# Patient Record
Sex: FEMALE | URBAN - METROPOLITAN AREA
[De-identification: names, ages, dates, MRNs, and addresses within clinical notes are randomized per-mention and may not be internally consistent; named-entity substitution may affect disease eponyms.]

---

## 2017-01-16 ENCOUNTER — DOCTOR'S OFFICE (OUTPATIENT)
Dept: URBAN - METROPOLITAN AREA CLINIC 136 | Facility: CLINIC | Age: 52
Setting detail: OPHTHALMOLOGY
End: 2017-01-16

## 2017-01-16 DIAGNOSIS — E11.3523: ICD-10-CM

## 2017-01-16 DIAGNOSIS — H43.11: ICD-10-CM

## 2017-01-16 PROCEDURE — 99024 POSTOP FOLLOW-UP VISIT: CPT | Performed by: OPHTHALMOLOGY

## 2017-01-16 ASSESSMENT — REFRACTION_MANIFEST
OD_VA3: 20/
OD_VA3: 20/
OS_VA1: 20/
OD_VA1: 20/
OS_VA3: 20/
OD_VA1: 20/
OS_VA3: 20/
OD_VA2: 20/
OS_VA1: 20/
OU_VA: 20/
OS_VA2: 20/
OS_VA1: 20/
OS_VA2: 20/
OD_VA2: 20/
OD_VA2: 20/
OD_VA3: 20/
OU_VA: 20/
OS_VA3: 20/
OU_VA: 20/
OS_VA2: 20/
OD_VA1: 20/

## 2017-01-16 ASSESSMENT — REFRACTION_CURRENTRX
OS_OVR_VA: 20/
OD_OVR_VA: 20/
OD_OVR_VA: 20/
OS_OVR_VA: 20/
OS_OVR_VA: 20/
OD_OVR_VA: 20/

## 2017-01-16 ASSESSMENT — VISUAL ACUITY
OD_BCVA: 20/LP
OS_BCVA: 20/HM

## 2017-01-16 ASSESSMENT — REFRACTION_AUTOREFRACTION
OD_SPHERE: +0.75
OD_AXIS: 99
OD_CYLINDER: -2.00

## 2017-01-16 ASSESSMENT — CORNEAL PTERYGIUM: OD_PTERYGIUM: TEMPORAL NASAL

## 2017-01-16 ASSESSMENT — CONFRONTATIONAL VISUAL FIELD TEST (CVF): OD_FINDINGS: FULL

## 2017-01-16 ASSESSMENT — SPHEQUIV_DERIVED: OD_SPHEQUIV: -0.25

## 2017-02-03 ENCOUNTER — DOCTOR'S OFFICE (OUTPATIENT)
Dept: URBAN - METROPOLITAN AREA CLINIC 136 | Facility: CLINIC | Age: 52
Setting detail: OPHTHALMOLOGY
End: 2017-02-03
Payer: COMMERCIAL

## 2017-02-03 DIAGNOSIS — H43.11: ICD-10-CM

## 2017-02-03 DIAGNOSIS — E11.3523: ICD-10-CM

## 2017-02-03 PROCEDURE — 99024 POSTOP FOLLOW-UP VISIT: CPT | Performed by: OPHTHALMOLOGY

## 2017-02-03 PROCEDURE — 76512 OPH US DX B-SCAN: CPT | Performed by: OPHTHALMOLOGY

## 2017-02-03 ASSESSMENT — REFRACTION_AUTOREFRACTION
OD_CYLINDER: -2.00
OD_AXIS: 99
OD_SPHERE: +0.75

## 2017-02-03 ASSESSMENT — VISUAL ACUITY
OD_BCVA: 20/LP
OS_BCVA: 20/HM

## 2017-02-03 ASSESSMENT — SPHEQUIV_DERIVED: OD_SPHEQUIV: -0.25

## 2017-02-03 ASSESSMENT — CORNEAL PTERYGIUM: OD_PTERYGIUM: TEMPORAL NASAL

## 2017-04-14 ENCOUNTER — DOCTOR'S OFFICE (OUTPATIENT)
Dept: URBAN - METROPOLITAN AREA CLINIC 136 | Facility: CLINIC | Age: 52
Setting detail: OPHTHALMOLOGY
End: 2017-04-14
Payer: COMMERCIAL

## 2017-04-14 ENCOUNTER — RX ONLY (RX ONLY)
Age: 52
End: 2017-04-14

## 2017-04-14 DIAGNOSIS — Z96.1: ICD-10-CM

## 2017-04-14 DIAGNOSIS — E11.3523: ICD-10-CM

## 2017-04-14 DIAGNOSIS — H43.11: ICD-10-CM

## 2017-04-14 DIAGNOSIS — H11.061: ICD-10-CM

## 2017-04-14 DIAGNOSIS — H25.12: ICD-10-CM

## 2017-04-14 PROCEDURE — 92014 COMPRE OPH EXAM EST PT 1/>: CPT | Performed by: OPHTHALMOLOGY

## 2017-04-14 PROCEDURE — 92134 CPTRZ OPH DX IMG PST SGM RTA: CPT | Performed by: OPHTHALMOLOGY

## 2017-04-14 ASSESSMENT — REFRACTION_MANIFEST
OS_VA3: 20/
OD_VA2: 20/
OD_VA1: 20/
OS_VA2: 20/
OD_VA3: 20/
OD_VA3: 20/
OD_VA2: 20/
OS_VA1: 20/
OS_VA3: 20/
OD_VA3: 20/
OS_VA1: 20/
OD_VA2: 20/
OS_VA1: 20/
OD_VA1: 20/
OU_VA: 20/
OS_VA2: 20/
OU_VA: 20/
OU_VA: 20/
OS_VA3: 20/
OD_VA1: 20/
OS_VA2: 20/

## 2017-04-14 ASSESSMENT — REFRACTION_CURRENTRX
OS_OVR_VA: 20/
OD_OVR_VA: 20/

## 2017-04-14 ASSESSMENT — CORNEAL PTERYGIUM: OD_PTERYGIUM: TEMPORAL NASAL

## 2017-04-14 ASSESSMENT — REFRACTION_AUTOREFRACTION
OD_AXIS: 99
OD_SPHERE: +0.75
OD_CYLINDER: -2.00

## 2017-04-14 ASSESSMENT — VISUAL ACUITY
OD_BCVA: 20/LP
OS_BCVA: 20/HM

## 2017-04-14 ASSESSMENT — CONFRONTATIONAL VISUAL FIELD TEST (CVF): OD_FINDINGS: FULL

## 2017-04-14 ASSESSMENT — SPHEQUIV_DERIVED: OD_SPHEQUIV: -0.25

## 2017-05-05 ENCOUNTER — RX ONLY (RX ONLY)
Age: 52
End: 2017-05-05

## 2017-05-05 ENCOUNTER — DOCTOR'S OFFICE (OUTPATIENT)
Dept: URBAN - METROPOLITAN AREA CLINIC 136 | Facility: CLINIC | Age: 52
Setting detail: OPHTHALMOLOGY
End: 2017-05-05

## 2017-05-05 DIAGNOSIS — E11.3523: ICD-10-CM

## 2017-05-05 DIAGNOSIS — H11.061: ICD-10-CM

## 2017-05-05 DIAGNOSIS — H25.12: ICD-10-CM

## 2017-05-05 DIAGNOSIS — H43.11: ICD-10-CM

## 2017-05-05 DIAGNOSIS — Z96.1: ICD-10-CM

## 2017-05-05 PROCEDURE — NO CHARGE N/C PROFESSIONAL COURTESY: Performed by: OPHTHALMOLOGY

## 2017-05-05 ASSESSMENT — REFRACTION_MANIFEST
OU_VA: 20/
OS_VA2: 20/
OD_VA2: 20/
OD_VA1: 20/
OS_VA3: 20/
OD_VA2: 20/
OD_VA3: 20/
OS_VA2: 20/
OS_VA1: 20/
OD_VA1: 20/
OS_VA1: 20/
OU_VA: 20/
OU_VA: 20/
OS_VA3: 20/
OS_VA2: 20/
OD_VA2: 20/
OS_VA1: 20/
OS_VA3: 20/
OD_VA1: 20/
OD_VA3: 20/
OD_VA3: 20/

## 2017-05-05 ASSESSMENT — REFRACTION_AUTOREFRACTION
OD_SPHERE: +0.75
OD_AXIS: 99
OD_CYLINDER: -2.00

## 2017-05-05 ASSESSMENT — CONFRONTATIONAL VISUAL FIELD TEST (CVF): OD_FINDINGS: FULL

## 2017-05-05 ASSESSMENT — REFRACTION_CURRENTRX
OS_OVR_VA: 20/
OS_OVR_VA: 20/
OD_OVR_VA: 20/
OD_OVR_VA: 20/
OS_OVR_VA: 20/
OD_OVR_VA: 20/

## 2017-05-05 ASSESSMENT — CORNEAL PTERYGIUM: OD_PTERYGIUM: TEMPORAL NASAL

## 2017-05-05 ASSESSMENT — VISUAL ACUITY
OS_BCVA: 20/HM
OD_BCVA: 20/LP

## 2017-05-05 ASSESSMENT — SPHEQUIV_DERIVED: OD_SPHEQUIV: -0.25

## 2017-07-24 ENCOUNTER — APPOINTMENT (EMERGENCY)
Dept: RADIOLOGY | Facility: HOSPITAL | Age: 52
End: 2017-07-24
Payer: COMMERCIAL

## 2017-07-24 ENCOUNTER — HOSPITAL ENCOUNTER (EMERGENCY)
Facility: HOSPITAL | Age: 52
Discharge: HOME | End: 2017-07-25
Attending: EMERGENCY MEDICINE | Admitting: EMERGENCY MEDICINE
Payer: COMMERCIAL

## 2017-07-24 DIAGNOSIS — R56.9 SEIZURE (HCC): Primary | ICD-10-CM

## 2017-07-24 LAB
ALBUMIN SERPL BCP-MCNC: 2.9 G/DL (ref 3.5–5)
ALP SERPL-CCNC: 101 U/L (ref 46–116)
ALT SERPL W P-5'-P-CCNC: 69 U/L (ref 12–78)
ANION GAP SERPL CALCULATED.3IONS-SCNC: 8 MMOL/L (ref 4–13)
APTT PPP: 33 SECONDS (ref 23–35)
AST SERPL W P-5'-P-CCNC: 91 U/L (ref 5–45)
BASOPHILS # BLD AUTO: 0.02 THOUSANDS/ΜL (ref 0–0.1)
BASOPHILS NFR BLD AUTO: 0 % (ref 0–1)
BILIRUB SERPL-MCNC: 0.35 MG/DL (ref 0.2–1)
BILIRUB UR QL STRIP: NEGATIVE
BUN SERPL-MCNC: 29 MG/DL (ref 5–25)
CALCIUM SERPL-MCNC: 8.1 MG/DL (ref 8.3–10.1)
CHLORIDE SERPL-SCNC: 107 MMOL/L (ref 100–108)
CLARITY UR: CLEAR
CLARITY, POC: CLEAR
CO2 SERPL-SCNC: 22 MMOL/L (ref 21–32)
COLOR UR: YELLOW
COLOR, POC: YELLOW
CREAT SERPL-MCNC: 1.49 MG/DL (ref 0.6–1.3)
EOSINOPHIL # BLD AUTO: 0.05 THOUSAND/ΜL (ref 0–0.61)
EOSINOPHIL NFR BLD AUTO: 1 % (ref 0–6)
ERYTHROCYTE [DISTWIDTH] IN BLOOD BY AUTOMATED COUNT: 13.6 % (ref 11.6–15.1)
GFR SERPL CREATININE-BSD FRML MDRD: 41 ML/MIN/1.73SQ M
GLUCOSE SERPL-MCNC: 282 MG/DL (ref 65–140)
GLUCOSE UR STRIP-MCNC: ABNORMAL MG/DL
HCT VFR BLD AUTO: 32.3 % (ref 34.8–46.1)
HGB BLD-MCNC: 10.6 G/DL (ref 11.5–15.4)
HGB UR QL STRIP.AUTO: ABNORMAL
INR PPP: 0.98 (ref 0.86–1.16)
KETONES UR STRIP-MCNC: NEGATIVE MG/DL
LEUKOCYTE ESTERASE UR QL STRIP: NEGATIVE
LYMPHOCYTES # BLD AUTO: 0.81 THOUSANDS/ΜL (ref 0.6–4.47)
LYMPHOCYTES NFR BLD AUTO: 9 % (ref 14–44)
MCH RBC QN AUTO: 27.8 PG (ref 26.8–34.3)
MCHC RBC AUTO-ENTMCNC: 32.8 G/DL (ref 31.4–37.4)
MCV RBC AUTO: 85 FL (ref 82–98)
MONOCYTES # BLD AUTO: 0.6 THOUSAND/ΜL (ref 0.17–1.22)
MONOCYTES NFR BLD AUTO: 7 % (ref 4–12)
NEUTROPHILS # BLD AUTO: 7.44 THOUSANDS/ΜL (ref 1.85–7.62)
NEUTS SEG NFR BLD AUTO: 83 % (ref 43–75)
NITRITE UR QL STRIP: NEGATIVE
NRBC BLD AUTO-RTO: 0 /100 WBCS
PH UR STRIP.AUTO: 7 [PH] (ref 4.5–8)
PLATELET # BLD AUTO: 319 THOUSANDS/UL (ref 149–390)
PMV BLD AUTO: 10.6 FL (ref 8.9–12.7)
POTASSIUM SERPL-SCNC: 5.8 MMOL/L (ref 3.5–5.3)
PROT SERPL-MCNC: 6.3 G/DL (ref 6.4–8.2)
PROT UR STRIP-MCNC: ABNORMAL MG/DL
PROTHROMBIN TIME: 13 SECONDS (ref 12.1–14.4)
RBC # BLD AUTO: 3.81 MILLION/UL (ref 3.81–5.12)
SODIUM SERPL-SCNC: 137 MMOL/L (ref 136–145)
SP GR UR STRIP.AUTO: 1.01 (ref 1–1.03)
SPECIMEN SOURCE: NORMAL
TROPONIN I BLD-MCNC: 0.02 NG/ML (ref 0–0.08)
UROBILINOGEN UR QL STRIP.AUTO: 0.2 E.U./DL
WBC # BLD AUTO: 8.92 THOUSAND/UL (ref 4.31–10.16)

## 2017-07-24 PROCEDURE — 81002 URINALYSIS NONAUTO W/O SCOPE: CPT | Performed by: EMERGENCY MEDICINE

## 2017-07-24 PROCEDURE — 81001 URINALYSIS AUTO W/SCOPE: CPT

## 2017-07-24 PROCEDURE — 85025 COMPLETE CBC W/AUTO DIFF WBC: CPT | Performed by: EMERGENCY MEDICINE

## 2017-07-24 PROCEDURE — 84484 ASSAY OF TROPONIN QUANT: CPT

## 2017-07-24 PROCEDURE — 80053 COMPREHEN METABOLIC PANEL: CPT | Performed by: EMERGENCY MEDICINE

## 2017-07-24 PROCEDURE — 93005 ELECTROCARDIOGRAM TRACING: CPT | Performed by: EMERGENCY MEDICINE

## 2017-07-24 PROCEDURE — 71020 HB CHEST X-RAY 2VW FRONTAL&LATL: CPT

## 2017-07-24 PROCEDURE — 36415 COLL VENOUS BLD VENIPUNCTURE: CPT | Performed by: EMERGENCY MEDICINE

## 2017-07-24 PROCEDURE — 85610 PROTHROMBIN TIME: CPT | Performed by: EMERGENCY MEDICINE

## 2017-07-24 PROCEDURE — 96365 THER/PROPH/DIAG IV INF INIT: CPT

## 2017-07-24 PROCEDURE — 85730 THROMBOPLASTIN TIME PARTIAL: CPT | Performed by: EMERGENCY MEDICINE

## 2017-07-24 RX ADMIN — LEVETIRACETAM 500 MG: 100 INJECTION, SOLUTION INTRAVENOUS at 23:44

## 2017-07-25 ENCOUNTER — APPOINTMENT (EMERGENCY)
Dept: CT IMAGING | Facility: HOSPITAL | Age: 52
End: 2017-07-25
Payer: COMMERCIAL

## 2017-07-25 VITALS
OXYGEN SATURATION: 99 % | HEART RATE: 79 BPM | RESPIRATION RATE: 17 BRPM | SYSTOLIC BLOOD PRESSURE: 149 MMHG | WEIGHT: 146.16 LBS | TEMPERATURE: 97.6 F | DIASTOLIC BLOOD PRESSURE: 70 MMHG

## 2017-07-25 LAB
ATRIAL RATE: 83 BPM
BACTERIA UR QL AUTO: ABNORMAL /HPF
NON-SQ EPI CELLS URNS QL MICRO: ABNORMAL /HPF
P AXIS: 59 DEGREES
PR INTERVAL: 148 MS
QRS AXIS: -49 DEGREES
QRSD INTERVAL: 138 MS
QT INTERVAL: 372 MS
QTC INTERVAL: 437 MS
RBC #/AREA URNS AUTO: ABNORMAL /HPF
T WAVE AXIS: 4 DEGREES
VENTRICULAR RATE: 83 BPM
WBC #/AREA URNS AUTO: ABNORMAL /HPF

## 2017-07-25 PROCEDURE — 99285 EMERGENCY DEPT VISIT HI MDM: CPT

## 2017-07-25 PROCEDURE — 70450 CT HEAD/BRAIN W/O DYE: CPT

## 2017-07-25 PROCEDURE — 96361 HYDRATE IV INFUSION ADD-ON: CPT

## 2017-07-25 RX ORDER — LANOLIN ALCOHOL/MO/W.PET/CERES
3 CREAM (GRAM) TOPICAL
COMMUNITY
End: 2019-05-26 | Stop reason: ALTCHOICE

## 2017-07-25 RX ORDER — OMEPRAZOLE 20 MG/1
20 CAPSULE, DELAYED RELEASE ORAL DAILY
COMMUNITY

## 2017-07-25 RX ORDER — LOSARTAN POTASSIUM 50 MG/1
50 TABLET ORAL DAILY
COMMUNITY
End: 2021-03-24 | Stop reason: HOSPADM

## 2017-07-25 RX ORDER — RANITIDINE 150 MG/1
150 TABLET ORAL
COMMUNITY
End: 2019-05-26 | Stop reason: ALTCHOICE

## 2017-07-25 RX ORDER — CLOPIDOGREL BISULFATE 75 MG/1
75 TABLET ORAL DAILY
COMMUNITY

## 2017-07-25 RX ORDER — LEVETIRACETAM 500 MG/1
500 TABLET ORAL EVERY 12 HOURS SCHEDULED
Qty: 14 TABLET | Refills: 0 | Status: SHIPPED | OUTPATIENT
Start: 2017-07-25 | End: 2021-03-24 | Stop reason: HOSPADM

## 2017-07-25 RX ORDER — ATORVASTATIN CALCIUM 80 MG/1
80 TABLET, FILM COATED ORAL DAILY
COMMUNITY

## 2017-07-25 RX ORDER — ASPIRIN 81 MG/1
81 TABLET ORAL DAILY
COMMUNITY

## 2017-07-25 RX ORDER — PREDNISOLONE ACETATE 10 MG/ML
1 SUSPENSION/ DROPS OPHTHALMIC 2 TIMES DAILY
COMMUNITY
End: 2019-05-26 | Stop reason: ALTCHOICE

## 2017-07-25 RX ORDER — ATROPINE SULFATE 10 MG/ML
1 SOLUTION/ DROPS OPHTHALMIC 2 TIMES DAILY
COMMUNITY
End: 2019-05-26 | Stop reason: ALTCHOICE

## 2017-07-25 RX ORDER — ALUMINUM HYDROXIDE, MAGNESIUM HYDROXIDE, SIMETHICONE 400; 400; 40 MG/10ML; MG/10ML; MG/10ML
30 SUSPENSION ORAL EVERY 6 HOURS PRN
COMMUNITY
End: 2019-05-26 | Stop reason: ALTCHOICE

## 2017-07-25 RX ORDER — ACETAMINOPHEN 325 MG/1
650 TABLET ORAL EVERY 4 HOURS PRN
COMMUNITY

## 2017-07-25 RX ORDER — INSULIN GLARGINE 100 [IU]/ML
25 INJECTION, SOLUTION SUBCUTANEOUS
COMMUNITY

## 2017-07-25 RX ADMIN — SODIUM CHLORIDE 1000 ML: 0.9 INJECTION, SOLUTION INTRAVENOUS at 01:06

## 2017-07-27 ENCOUNTER — DOCTOR'S OFFICE (OUTPATIENT)
Dept: URBAN - METROPOLITAN AREA CLINIC 136 | Facility: CLINIC | Age: 52
Setting detail: OPHTHALMOLOGY
End: 2017-07-27
Payer: COMMERCIAL

## 2017-07-27 DIAGNOSIS — H11.061: ICD-10-CM

## 2017-07-27 DIAGNOSIS — Z96.1: ICD-10-CM

## 2017-07-27 DIAGNOSIS — E11.3531: ICD-10-CM

## 2017-07-27 DIAGNOSIS — E11.3522: ICD-10-CM

## 2017-07-27 DIAGNOSIS — H25.12: ICD-10-CM

## 2017-07-27 PROCEDURE — 92250 FUNDUS PHOTOGRAPHY W/I&R: CPT | Performed by: OPHTHALMOLOGY

## 2017-07-27 PROCEDURE — 92235 FLUORESCEIN ANGRPH MLTIFRAME: CPT | Performed by: OPHTHALMOLOGY

## 2017-07-27 PROCEDURE — 92014 COMPRE OPH EXAM EST PT 1/>: CPT | Performed by: OPHTHALMOLOGY

## 2017-07-27 ASSESSMENT — REFRACTION_MANIFEST
OD_VA2: 20/
OS_VA3: 20/
OD_VA3: 20/
OD_VA3: 20/
OS_VA3: 20/
OS_VA1: 20/
OS_VA1: 20/
OD_VA2: 20/
OD_VA1: 20/
OU_VA: 20/
OD_VA1: 20/
OU_VA: 20/
OS_VA2: 20/
OU_VA: 20/
OD_VA2: 20/
OS_VA1: 20/
OS_VA2: 20/
OS_VA3: 20/
OD_VA3: 20/
OS_VA2: 20/
OD_VA1: 20/

## 2017-07-27 ASSESSMENT — VISUAL ACUITY
OD_BCVA: 20/LP
OS_BCVA: 20/HM

## 2017-07-27 ASSESSMENT — CORNEAL PTERYGIUM: OD_PTERYGIUM: TEMPORAL NASAL

## 2017-07-27 ASSESSMENT — REFRACTION_CURRENTRX
OS_OVR_VA: 20/
OD_OVR_VA: 20/

## 2017-07-27 ASSESSMENT — REFRACTION_AUTOREFRACTION
OD_CYLINDER: -2.00
OD_SPHERE: +0.75
OD_AXIS: 99

## 2017-07-27 ASSESSMENT — CORNEAL EDEMA CLINICAL DESCRIPTION: OS_CORNEALEDEMA: ABSENT

## 2017-07-27 ASSESSMENT — SPHEQUIV_DERIVED: OD_SPHEQUIV: -0.25

## 2017-08-08 ENCOUNTER — DOCTOR'S OFFICE (OUTPATIENT)
Dept: URBAN - METROPOLITAN AREA CLINIC 136 | Facility: CLINIC | Age: 52
Setting detail: OPHTHALMOLOGY
End: 2017-08-08
Payer: COMMERCIAL

## 2017-08-08 DIAGNOSIS — E11.3522: ICD-10-CM

## 2017-08-08 DIAGNOSIS — H25.12: ICD-10-CM

## 2017-08-08 DIAGNOSIS — E11.3531: ICD-10-CM

## 2017-08-08 DIAGNOSIS — H11.061: ICD-10-CM

## 2017-08-08 DIAGNOSIS — Z96.1: ICD-10-CM

## 2017-08-08 PROCEDURE — 67228 TREATMENT X10SV RETINOPATHY: CPT | Performed by: OPHTHALMOLOGY

## 2017-08-08 PROCEDURE — 92134 CPTRZ OPH DX IMG PST SGM RTA: CPT | Performed by: OPHTHALMOLOGY

## 2017-08-08 PROCEDURE — 92012 INTRM OPH EXAM EST PATIENT: CPT | Performed by: OPHTHALMOLOGY

## 2017-08-08 ASSESSMENT — CORNEAL PTERYGIUM: OD_PTERYGIUM: TEMPORAL NASAL

## 2017-08-08 ASSESSMENT — CORNEAL EDEMA CLINICAL DESCRIPTION: OS_CORNEALEDEMA: ABSENT

## 2017-08-09 ASSESSMENT — REFRACTION_MANIFEST
OS_VA3: 20/
OU_VA: 20/
OS_VA2: 20/
OD_VA3: 20/
OS_VA1: 20/
OS_VA2: 20/
OU_VA: 20/
OD_VA2: 20/
OD_VA3: 20/
OD_VA1: 20/
OD_VA3: 20/
OD_VA1: 20/
OD_VA2: 20/
OS_VA3: 20/
OU_VA: 20/
OS_VA1: 20/
OS_VA1: 20/
OS_VA3: 20/
OD_VA1: 20/
OS_VA2: 20/
OD_VA2: 20/

## 2017-08-09 ASSESSMENT — REFRACTION_CURRENTRX
OS_OVR_VA: 20/
OD_OVR_VA: 20/
OS_OVR_VA: 20/
OD_OVR_VA: 20/
OS_OVR_VA: 20/
OD_OVR_VA: 20/

## 2017-08-09 ASSESSMENT — REFRACTION_AUTOREFRACTION
OD_AXIS: 99
OD_CYLINDER: -2.00
OD_SPHERE: +0.75

## 2017-08-09 ASSESSMENT — VISUAL ACUITY
OD_BCVA: 20/LP
OS_BCVA: 20/HM

## 2017-08-09 ASSESSMENT — SPHEQUIV_DERIVED: OD_SPHEQUIV: -0.25

## 2017-08-30 ENCOUNTER — DOCTOR'S OFFICE (OUTPATIENT)
Dept: URBAN - METROPOLITAN AREA CLINIC 136 | Facility: CLINIC | Age: 52
Setting detail: OPHTHALMOLOGY
End: 2017-08-30
Payer: COMMERCIAL

## 2017-08-30 DIAGNOSIS — E11.3531: ICD-10-CM

## 2017-08-30 DIAGNOSIS — E11.3522: ICD-10-CM

## 2017-08-30 DIAGNOSIS — Z96.1: ICD-10-CM

## 2017-08-30 DIAGNOSIS — H11.061: ICD-10-CM

## 2017-08-30 DIAGNOSIS — H25.12: ICD-10-CM

## 2017-08-30 PROCEDURE — 92014 COMPRE OPH EXAM EST PT 1/>: CPT | Performed by: OPHTHALMOLOGY

## 2017-08-30 PROCEDURE — 92134 CPTRZ OPH DX IMG PST SGM RTA: CPT | Performed by: OPHTHALMOLOGY

## 2017-08-30 ASSESSMENT — REFRACTION_MANIFEST
OS_VA2: 20/
OU_VA: 20/
OS_VA1: 20/
OD_VA3: 20/
OD_VA3: 20/
OS_VA1: 20/
OD_VA1: 20/
OS_VA2: 20/
OD_VA1: 20/
OS_VA2: 20/
OD_VA2: 20/
OS_VA3: 20/
OD_VA3: 20/
OD_VA1: 20/
OU_VA: 20/
OS_VA3: 20/
OD_VA2: 20/
OD_VA2: 20/
OS_VA1: 20/
OS_VA3: 20/
OU_VA: 20/

## 2017-08-30 ASSESSMENT — REFRACTION_CURRENTRX
OS_OVR_VA: 20/
OD_OVR_VA: 20/
OD_OVR_VA: 20/
OS_OVR_VA: 20/
OS_OVR_VA: 20/
OD_OVR_VA: 20/

## 2017-08-30 ASSESSMENT — REFRACTION_AUTOREFRACTION
OD_CYLINDER: -2.00
OD_AXIS: 99
OD_SPHERE: +0.75

## 2017-08-30 ASSESSMENT — SPHEQUIV_DERIVED: OD_SPHEQUIV: -0.25

## 2017-08-30 ASSESSMENT — FILAMENTARY KERATITIS SEVERITY (FKS): OD_FKS: MILD

## 2017-08-30 ASSESSMENT — CORNEAL PTERYGIUM: OD_PTERYGIUM: TEMPORAL NASAL

## 2017-08-30 ASSESSMENT — VISUAL ACUITY
OS_BCVA: 20/HM
OD_BCVA: 20/LP

## 2017-08-30 ASSESSMENT — CORNEAL EDEMA CLINICAL DESCRIPTION: OS_CORNEALEDEMA: ABSENT

## 2017-09-27 ENCOUNTER — DOCTOR'S OFFICE (OUTPATIENT)
Dept: URBAN - METROPOLITAN AREA CLINIC 136 | Facility: CLINIC | Age: 52
Setting detail: OPHTHALMOLOGY
End: 2017-09-27
Payer: COMMERCIAL

## 2017-09-27 DIAGNOSIS — E11.3521: ICD-10-CM

## 2017-09-27 DIAGNOSIS — H11.061: ICD-10-CM

## 2017-09-27 DIAGNOSIS — E11.3522: ICD-10-CM

## 2017-09-27 DIAGNOSIS — H25.12: ICD-10-CM

## 2017-09-27 DIAGNOSIS — Z96.1: ICD-10-CM

## 2017-09-27 PROCEDURE — 92014 COMPRE OPH EXAM EST PT 1/>: CPT | Performed by: OPHTHALMOLOGY

## 2017-09-27 ASSESSMENT — REFRACTION_MANIFEST
OD_VA3: 20/
OS_VA3: 20/
OS_VA2: 20/
OD_VA3: 20/
OD_VA2: 20/
OS_VA3: 20/
OS_VA1: 20/
OD_VA2: 20/
OS_VA1: 20/
OD_VA1: 20/
OS_VA2: 20/
OD_VA2: 20/
OD_VA3: 20/
OD_VA1: 20/
OS_VA3: 20/
OD_VA1: 20/
OS_VA1: 20/
OS_VA2: 20/
OU_VA: 20/

## 2017-09-27 ASSESSMENT — REFRACTION_CURRENTRX
OS_OVR_VA: 20/
OS_OVR_VA: 20/
OD_OVR_VA: 20/
OS_OVR_VA: 20/
OD_OVR_VA: 20/
OD_OVR_VA: 20/

## 2017-09-27 ASSESSMENT — CORNEAL PTERYGIUM: OD_PTERYGIUM: TEMPORAL NASAL

## 2017-09-27 ASSESSMENT — REFRACTION_AUTOREFRACTION
OD_SPHERE: +0.75
OD_CYLINDER: -2.00
OD_AXIS: 99

## 2017-09-27 ASSESSMENT — CORNEAL EDEMA CLINICAL DESCRIPTION: OS_CORNEALEDEMA: ABSENT

## 2017-09-27 ASSESSMENT — VISUAL ACUITY
OS_BCVA: 20/HM
OD_BCVA: 20/LP

## 2017-09-27 ASSESSMENT — FILAMENTARY KERATITIS SEVERITY (FKS): OD_FKS: MILD

## 2017-09-27 ASSESSMENT — SPHEQUIV_DERIVED: OD_SPHEQUIV: -0.25

## 2017-10-30 ENCOUNTER — DOCTOR'S OFFICE (OUTPATIENT)
Dept: URBAN - METROPOLITAN AREA CLINIC 136 | Facility: CLINIC | Age: 52
Setting detail: OPHTHALMOLOGY
End: 2017-10-30
Payer: COMMERCIAL

## 2017-10-30 DIAGNOSIS — E11.3523: ICD-10-CM

## 2017-10-30 DIAGNOSIS — H11.061: ICD-10-CM

## 2017-10-30 DIAGNOSIS — Z96.1: ICD-10-CM

## 2017-10-30 DIAGNOSIS — H25.12: ICD-10-CM

## 2017-10-30 PROCEDURE — 92134 CPTRZ OPH DX IMG PST SGM RTA: CPT | Performed by: OPHTHALMOLOGY

## 2017-10-30 PROCEDURE — 92014 COMPRE OPH EXAM EST PT 1/>: CPT | Performed by: OPHTHALMOLOGY

## 2017-10-30 ASSESSMENT — CORNEAL EDEMA CLINICAL DESCRIPTION: OS_CORNEALEDEMA: ABSENT

## 2017-10-30 ASSESSMENT — REFRACTION_CURRENTRX
OS_OVR_VA: 20/
OS_OVR_VA: 20/
OD_OVR_VA: 20/
OS_OVR_VA: 20/

## 2017-10-30 ASSESSMENT — REFRACTION_MANIFEST
OS_VA1: 20/
OS_VA2: 20/
OS_VA1: 20/
OS_VA3: 20/
OD_VA1: 20/
OD_VA2: 20/
OD_VA3: 20/
OS_VA2: 20/
OD_VA1: 20/
OD_VA2: 20/
OU_VA: 20/
OU_VA: 20/
OS_VA1: 20/
OS_VA3: 20/
OS_VA3: 20/
OD_VA3: 20/
OD_VA2: 20/
OS_VA2: 20/
OD_VA1: 20/
OU_VA: 20/
OD_VA3: 20/

## 2017-10-30 ASSESSMENT — VISUAL ACUITY
OS_BCVA: 20/HM
OD_BCVA: 20/LP

## 2017-10-30 ASSESSMENT — REFRACTION_AUTOREFRACTION
OD_CYLINDER: -2.00
OD_SPHERE: +0.75
OD_AXIS: 99

## 2017-10-30 ASSESSMENT — CORNEAL PTERYGIUM: OD_PTERYGIUM: TEMPORAL NASAL

## 2017-10-30 ASSESSMENT — SPHEQUIV_DERIVED: OD_SPHEQUIV: -0.25

## 2017-10-30 ASSESSMENT — FILAMENTARY KERATITIS SEVERITY (FKS): OD_FKS: MILD

## 2018-02-01 ENCOUNTER — DOCTOR'S OFFICE (OUTPATIENT)
Dept: URBAN - METROPOLITAN AREA CLINIC 136 | Facility: CLINIC | Age: 53
Setting detail: OPHTHALMOLOGY
End: 2018-02-01
Payer: COMMERCIAL

## 2018-02-01 DIAGNOSIS — E11.3523: ICD-10-CM

## 2018-02-01 DIAGNOSIS — H25.12: ICD-10-CM

## 2018-02-01 DIAGNOSIS — H11.061: ICD-10-CM

## 2018-02-01 DIAGNOSIS — Z96.1: ICD-10-CM

## 2018-02-01 PROCEDURE — 92012 INTRM OPH EXAM EST PATIENT: CPT | Performed by: OPHTHALMOLOGY

## 2018-02-01 PROCEDURE — 92134 CPTRZ OPH DX IMG PST SGM RTA: CPT | Performed by: OPHTHALMOLOGY

## 2018-02-01 ASSESSMENT — CORNEAL PTERYGIUM: OD_PTERYGIUM: TEMPORAL NASAL

## 2018-02-01 ASSESSMENT — CORNEAL EDEMA CLINICAL DESCRIPTION: OS_CORNEALEDEMA: ABSENT

## 2018-02-01 ASSESSMENT — FILAMENTARY KERATITIS SEVERITY (FKS): OD_FKS: MILD

## 2018-02-04 ASSESSMENT — REFRACTION_MANIFEST
OD_VA2: 20/
OU_VA: 20/
OU_VA: 20/
OS_VA3: 20/
OS_VA1: 20/
OD_VA3: 20/
OS_VA2: 20/
OD_VA2: 20/
OS_VA3: 20/
OS_VA2: 20/
OD_VA1: 20/
OS_VA1: 20/
OS_VA1: 20/
OS_VA3: 20/
OD_VA2: 20/
OD_VA3: 20/
OS_VA2: 20/
OD_VA1: 20/
OU_VA: 20/
OD_VA1: 20/
OD_VA3: 20/

## 2018-02-04 ASSESSMENT — REFRACTION_AUTOREFRACTION
OD_SPHERE: +0.75
OD_CYLINDER: -2.00
OD_AXIS: 99

## 2018-02-04 ASSESSMENT — REFRACTION_CURRENTRX
OS_OVR_VA: 20/
OS_OVR_VA: 20/
OD_OVR_VA: 20/
OS_OVR_VA: 20/
OD_OVR_VA: 20/
OD_OVR_VA: 20/

## 2018-02-04 ASSESSMENT — VISUAL ACUITY
OD_BCVA: 20/LP
OS_BCVA: 20/HM

## 2018-02-04 ASSESSMENT — SPHEQUIV_DERIVED: OD_SPHEQUIV: -0.25

## 2018-05-24 ENCOUNTER — DOCTOR'S OFFICE (OUTPATIENT)
Dept: URBAN - METROPOLITAN AREA CLINIC 136 | Facility: CLINIC | Age: 53
Setting detail: OPHTHALMOLOGY
End: 2018-05-24
Payer: COMMERCIAL

## 2018-05-24 DIAGNOSIS — E11.3521: ICD-10-CM

## 2018-05-24 DIAGNOSIS — H11.061: ICD-10-CM

## 2018-05-24 DIAGNOSIS — Z96.1: ICD-10-CM

## 2018-05-24 DIAGNOSIS — H25.12: ICD-10-CM

## 2018-05-24 DIAGNOSIS — E11.3522: ICD-10-CM

## 2018-05-24 PROCEDURE — 92134 CPTRZ OPH DX IMG PST SGM RTA: CPT | Performed by: OPHTHALMOLOGY

## 2018-05-24 PROCEDURE — 92014 COMPRE OPH EXAM EST PT 1/>: CPT | Performed by: OPHTHALMOLOGY

## 2018-05-24 ASSESSMENT — CORNEAL PTERYGIUM: OD_PTERYGIUM: TEMPORAL NASAL

## 2018-05-24 ASSESSMENT — FILAMENTARY KERATITIS SEVERITY (FKS): OD_FKS: MILD

## 2018-05-24 ASSESSMENT — CORNEAL EDEMA CLINICAL DESCRIPTION: OS_CORNEALEDEMA: ABSENT

## 2018-05-25 ASSESSMENT — VISUAL ACUITY
OS_BCVA: 20/HM
OD_BCVA: 20/LP

## 2018-05-25 ASSESSMENT — REFRACTION_MANIFEST
OD_VA1: 20/
OS_VA3: 20/
OD_VA3: 20/
OS_VA2: 20/
OS_VA3: 20/
OD_VA1: 20/
OU_VA: 20/
OS_VA2: 20/
OS_VA3: 20/
OD_VA2: 20/
OS_VA1: 20/
OU_VA: 20/
OD_VA2: 20/
OS_VA1: 20/
OU_VA: 20/
OD_VA3: 20/
OD_VA1: 20/
OS_VA1: 20/
OD_VA2: 20/
OD_VA3: 20/
OS_VA2: 20/

## 2018-05-25 ASSESSMENT — REFRACTION_CURRENTRX
OS_OVR_VA: 20/
OD_OVR_VA: 20/

## 2018-05-25 ASSESSMENT — REFRACTION_AUTOREFRACTION
OD_SPHERE: +0.75
OD_AXIS: 99
OD_CYLINDER: -2.00

## 2018-05-25 ASSESSMENT — SPHEQUIV_DERIVED: OD_SPHEQUIV: -0.25

## 2018-09-06 ENCOUNTER — DOCTOR'S OFFICE (OUTPATIENT)
Dept: URBAN - METROPOLITAN AREA CLINIC 136 | Facility: CLINIC | Age: 53
Setting detail: OPHTHALMOLOGY
End: 2018-09-06
Payer: COMMERCIAL

## 2018-09-06 DIAGNOSIS — Z96.1: ICD-10-CM

## 2018-09-06 DIAGNOSIS — E11.3523: ICD-10-CM

## 2018-09-06 DIAGNOSIS — H11.061: ICD-10-CM

## 2018-09-06 DIAGNOSIS — H25.12: ICD-10-CM

## 2018-09-06 PROBLEM — E11.3521: Status: ACTIVE | Noted: 2017-08-30

## 2018-09-06 PROBLEM — E11.3522: Status: ACTIVE | Noted: 2017-08-30

## 2018-09-06 PROCEDURE — 92012 INTRM OPH EXAM EST PATIENT: CPT | Performed by: OPHTHALMOLOGY

## 2018-09-06 PROCEDURE — 92134 CPTRZ OPH DX IMG PST SGM RTA: CPT | Performed by: OPHTHALMOLOGY

## 2018-09-06 ASSESSMENT — CORNEAL PTERYGIUM: OD_PTERYGIUM: TEMPORAL NASAL

## 2018-09-06 ASSESSMENT — CORNEAL EDEMA CLINICAL DESCRIPTION: OS_CORNEALEDEMA: ABSENT

## 2018-09-06 ASSESSMENT — FILAMENTARY KERATITIS SEVERITY (FKS): OD_FKS: MILD

## 2018-09-07 ASSESSMENT — REFRACTION_MANIFEST
OD_VA2: 20/
OS_VA2: 20/
OD_VA3: 20/
OD_VA2: 20/
OS_VA2: 20/
OS_VA1: 20/
OS_VA3: 20/
OD_VA1: 20/
OD_VA3: 20/
OD_VA1: 20/
OU_VA: 20/
OS_VA3: 20/
OU_VA: 20/
OS_VA1: 20/

## 2018-09-07 ASSESSMENT — REFRACTION_CURRENTRX
OS_OVR_VA: 20/
OS_OVR_VA: 20/
OD_OVR_VA: 20/
OD_OVR_VA: 20/
OS_OVR_VA: 20/
OD_OVR_VA: 20/

## 2018-09-07 ASSESSMENT — REFRACTION_AUTOREFRACTION
OD_CYLINDER: -2.00
OD_AXIS: 99
OD_SPHERE: +0.75

## 2018-09-07 ASSESSMENT — VISUAL ACUITY
OS_BCVA: 20/HM
OD_BCVA: 20/LP

## 2018-09-07 ASSESSMENT — SPHEQUIV_DERIVED: OD_SPHEQUIV: -0.25

## 2019-05-26 ENCOUNTER — HOSPITAL ENCOUNTER (EMERGENCY)
Facility: HOSPITAL | Age: 54
Discharge: HOME | End: 2019-05-27
Attending: EMERGENCY MEDICINE | Admitting: EMERGENCY MEDICINE
Payer: COMMERCIAL

## 2019-05-26 DIAGNOSIS — E10.649 HYPOGLYCEMIA DUE TO TYPE 1 DIABETES MELLITUS (HCC): Primary | ICD-10-CM

## 2019-05-26 LAB
ALBUMIN SERPL BCP-MCNC: 3.2 G/DL (ref 3.5–5)
ALP SERPL-CCNC: 106 U/L (ref 46–116)
ALT SERPL W P-5'-P-CCNC: 34 U/L (ref 12–78)
ANION GAP SERPL CALCULATED.3IONS-SCNC: 7 MMOL/L (ref 4–13)
APTT PPP: 33 SECONDS (ref 26–38)
AST SERPL W P-5'-P-CCNC: 28 U/L (ref 5–45)
BASOPHILS # BLD AUTO: 0.02 THOUSANDS/ΜL (ref 0–0.1)
BASOPHILS NFR BLD AUTO: 0 % (ref 0–1)
BILIRUB SERPL-MCNC: 0.25 MG/DL (ref 0.2–1)
BILIRUB UR QL STRIP: NEGATIVE
BUN SERPL-MCNC: 30 MG/DL (ref 5–25)
CALCIUM SERPL-MCNC: 9.9 MG/DL (ref 8.3–10.1)
CHLORIDE SERPL-SCNC: 106 MMOL/L (ref 100–108)
CLARITY UR: CLEAR
CO2 SERPL-SCNC: 28 MMOL/L (ref 21–32)
COLOR UR: YELLOW
COLOR, POC: YELLOW
CREAT SERPL-MCNC: 1.17 MG/DL (ref 0.6–1.3)
EOSINOPHIL # BLD AUTO: 0.09 THOUSAND/ΜL (ref 0–0.61)
EOSINOPHIL NFR BLD AUTO: 1 % (ref 0–6)
ERYTHROCYTE [DISTWIDTH] IN BLOOD BY AUTOMATED COUNT: 12.8 % (ref 11.6–15.1)
GFR SERPL CREATININE-BSD FRML MDRD: 54 ML/MIN/1.73SQ M
GLUCOSE SERPL-MCNC: 109 MG/DL (ref 65–140)
GLUCOSE SERPL-MCNC: 142 MG/DL (ref 65–140)
GLUCOSE SERPL-MCNC: 207 MG/DL (ref 65–140)
GLUCOSE SERPL-MCNC: 245 MG/DL (ref 65–140)
GLUCOSE SERPL-MCNC: 290 MG/DL (ref 65–140)
GLUCOSE UR STRIP-MCNC: ABNORMAL MG/DL
HCT VFR BLD AUTO: 35.8 % (ref 34.8–46.1)
HGB BLD-MCNC: 11.2 G/DL (ref 11.5–15.4)
HGB UR QL STRIP.AUTO: NEGATIVE
IMM GRANULOCYTES # BLD AUTO: 0.01 THOUSAND/UL (ref 0–0.2)
IMM GRANULOCYTES NFR BLD AUTO: 0 % (ref 0–2)
INR PPP: 1 (ref 0.86–1.17)
KETONES UR STRIP-MCNC: NEGATIVE MG/DL
LEUKOCYTE ESTERASE UR QL STRIP: NEGATIVE
LYMPHOCYTES # BLD AUTO: 1.47 THOUSANDS/ΜL (ref 0.6–4.47)
LYMPHOCYTES NFR BLD AUTO: 23 % (ref 14–44)
MCH RBC QN AUTO: 28.4 PG (ref 26.8–34.3)
MCHC RBC AUTO-ENTMCNC: 31.3 G/DL (ref 31.4–37.4)
MCV RBC AUTO: 91 FL (ref 82–98)
MONOCYTES # BLD AUTO: 0.62 THOUSAND/ΜL (ref 0.17–1.22)
MONOCYTES NFR BLD AUTO: 10 % (ref 4–12)
NEUTROPHILS # BLD AUTO: 4.14 THOUSANDS/ΜL (ref 1.85–7.62)
NEUTS SEG NFR BLD AUTO: 66 % (ref 43–75)
NITRITE UR QL STRIP: NEGATIVE
NRBC BLD AUTO-RTO: 0 /100 WBCS
PH UR STRIP.AUTO: 6 [PH] (ref 4.5–8)
PLATELET # BLD AUTO: 319 THOUSANDS/UL (ref 149–390)
PMV BLD AUTO: 9.8 FL (ref 8.9–12.7)
POTASSIUM SERPL-SCNC: 4.2 MMOL/L (ref 3.5–5.3)
PROT SERPL-MCNC: 6.9 G/DL (ref 6.4–8.2)
PROT UR STRIP-MCNC: NEGATIVE MG/DL
PROTHROMBIN TIME: 13.3 SECONDS (ref 11.8–14.2)
RBC # BLD AUTO: 3.95 MILLION/UL (ref 3.81–5.12)
SODIUM SERPL-SCNC: 141 MMOL/L (ref 136–145)
SP GR UR STRIP.AUTO: 1.02 (ref 1–1.03)
UROBILINOGEN UR QL STRIP.AUTO: 0.2 E.U./DL
WBC # BLD AUTO: 6.35 THOUSAND/UL (ref 4.31–10.16)

## 2019-05-26 PROCEDURE — 85610 PROTHROMBIN TIME: CPT | Performed by: NURSE PRACTITIONER

## 2019-05-26 PROCEDURE — 85025 COMPLETE CBC W/AUTO DIFF WBC: CPT | Performed by: NURSE PRACTITIONER

## 2019-05-26 PROCEDURE — 36415 COLL VENOUS BLD VENIPUNCTURE: CPT | Performed by: NURSE PRACTITIONER

## 2019-05-26 PROCEDURE — 80053 COMPREHEN METABOLIC PANEL: CPT | Performed by: NURSE PRACTITIONER

## 2019-05-26 PROCEDURE — 82948 REAGENT STRIP/BLOOD GLUCOSE: CPT

## 2019-05-26 PROCEDURE — 99283 EMERGENCY DEPT VISIT LOW MDM: CPT | Performed by: NURSE PRACTITIONER

## 2019-05-26 PROCEDURE — 99285 EMERGENCY DEPT VISIT HI MDM: CPT

## 2019-05-26 PROCEDURE — 81003 URINALYSIS AUTO W/O SCOPE: CPT

## 2019-05-26 PROCEDURE — 93005 ELECTROCARDIOGRAM TRACING: CPT

## 2019-05-26 PROCEDURE — 85730 THROMBOPLASTIN TIME PARTIAL: CPT | Performed by: NURSE PRACTITIONER

## 2019-05-26 RX ORDER — METHIMAZOLE 10 MG/1
20 TABLET ORAL DAILY
Status: ON HOLD | COMMUNITY
End: 2021-03-24 | Stop reason: SDUPTHER

## 2019-05-26 RX ORDER — ECHINACEA PURPUREA EXTRACT 125 MG
1 TABLET ORAL AS NEEDED
COMMUNITY

## 2019-05-26 RX ORDER — VENLAFAXINE HYDROCHLORIDE 75 MG/1
75 CAPSULE, EXTENDED RELEASE ORAL DAILY
COMMUNITY

## 2019-05-26 RX ORDER — CALCIUM CARBONATE 200(500)MG
1 TABLET,CHEWABLE ORAL DAILY
COMMUNITY

## 2019-05-26 RX ORDER — SUCRALFATE 1 G/1
1 TABLET ORAL 4 TIMES DAILY
COMMUNITY

## 2019-05-26 RX ORDER — GUAIFENESIN 600 MG
1200 TABLET, EXTENDED RELEASE 12 HR ORAL EVERY 12 HOURS SCHEDULED
COMMUNITY
End: 2021-03-24 | Stop reason: HOSPADM

## 2019-05-26 RX ORDER — ATENOLOL 25 MG/1
25 TABLET ORAL DAILY
COMMUNITY

## 2019-05-26 RX ORDER — METOCLOPRAMIDE 5 MG/1
5 TABLET ORAL 4 TIMES DAILY
COMMUNITY

## 2019-05-26 RX ORDER — METHIMAZOLE 5 MG/1
5 TABLET ORAL 3 TIMES DAILY
Status: ON HOLD | COMMUNITY
End: 2021-03-22 | Stop reason: CLARIF

## 2019-05-26 RX ORDER — NICOTINE POLACRILEX 4 MG
15 LOZENGE BUCCAL ONCE
COMMUNITY

## 2019-05-27 VITALS
SYSTOLIC BLOOD PRESSURE: 138 MMHG | WEIGHT: 109.57 LBS | RESPIRATION RATE: 19 BRPM | DIASTOLIC BLOOD PRESSURE: 62 MMHG | OXYGEN SATURATION: 96 % | TEMPERATURE: 97.4 F | HEART RATE: 78 BPM

## 2019-05-27 LAB
ATRIAL RATE: 70 BPM
GLUCOSE SERPL-MCNC: 218 MG/DL (ref 65–140)
P AXIS: 70 DEGREES
PR INTERVAL: 142 MS
QRS AXIS: -61 DEGREES
QRSD INTERVAL: 122 MS
QT INTERVAL: 420 MS
QTC INTERVAL: 453 MS
T WAVE AXIS: 50 DEGREES
VENTRICULAR RATE: 70 BPM

## 2019-05-27 PROCEDURE — 82948 REAGENT STRIP/BLOOD GLUCOSE: CPT

## 2019-05-27 PROCEDURE — 93010 ELECTROCARDIOGRAM REPORT: CPT | Performed by: INTERNAL MEDICINE

## 2021-03-14 ENCOUNTER — HOSPITAL ENCOUNTER (EMERGENCY)
Facility: HOSPITAL | Age: 56
Discharge: HOME | End: 2021-03-14
Attending: EMERGENCY MEDICINE | Admitting: EMERGENCY MEDICINE
Payer: COMMERCIAL

## 2021-03-14 VITALS
SYSTOLIC BLOOD PRESSURE: 99 MMHG | OXYGEN SATURATION: 98 % | DIASTOLIC BLOOD PRESSURE: 52 MMHG | RESPIRATION RATE: 16 BRPM | WEIGHT: 130.95 LBS | HEART RATE: 60 BPM | TEMPERATURE: 98.2 F

## 2021-03-14 DIAGNOSIS — R55 SYNCOPE: Primary | ICD-10-CM

## 2021-03-14 DIAGNOSIS — N17.9 ACUTE KIDNEY INJURY (HCC): ICD-10-CM

## 2021-03-14 LAB
ALBUMIN SERPL BCP-MCNC: 3.4 G/DL (ref 3.5–5)
ALP SERPL-CCNC: 109 U/L (ref 46–116)
ALT SERPL W P-5'-P-CCNC: 36 U/L (ref 12–78)
ANION GAP SERPL CALCULATED.3IONS-SCNC: 8 MMOL/L (ref 4–13)
AST SERPL W P-5'-P-CCNC: 22 U/L (ref 5–45)
BASOPHILS # BLD AUTO: 0.02 THOUSANDS/ΜL (ref 0–0.1)
BASOPHILS NFR BLD AUTO: 0 % (ref 0–1)
BILIRUB DIRECT SERPL-MCNC: 0.09 MG/DL (ref 0–0.2)
BILIRUB SERPL-MCNC: 0.29 MG/DL (ref 0.2–1)
BUN SERPL-MCNC: 28 MG/DL (ref 5–25)
CALCIUM SERPL-MCNC: 10 MG/DL (ref 8.3–10.1)
CHLORIDE SERPL-SCNC: 105 MMOL/L (ref 100–108)
CO2 SERPL-SCNC: 26 MMOL/L (ref 21–32)
CREAT SERPL-MCNC: 1.39 MG/DL (ref 0.6–1.3)
EOSINOPHIL # BLD AUTO: 0.03 THOUSAND/ΜL (ref 0–0.61)
EOSINOPHIL NFR BLD AUTO: 1 % (ref 0–6)
ERYTHROCYTE [DISTWIDTH] IN BLOOD BY AUTOMATED COUNT: 12 % (ref 11.6–15.1)
GFR SERPL CREATININE-BSD FRML MDRD: 43 ML/MIN/1.73SQ M
GLUCOSE SERPL-MCNC: 180 MG/DL (ref 65–140)
HCT VFR BLD AUTO: 39.5 % (ref 34.8–46.1)
HGB BLD-MCNC: 13.1 G/DL (ref 11.5–15.4)
IMM GRANULOCYTES # BLD AUTO: 0.01 THOUSAND/UL (ref 0–0.2)
IMM GRANULOCYTES NFR BLD AUTO: 0 % (ref 0–2)
LYMPHOCYTES # BLD AUTO: 1.06 THOUSANDS/ΜL (ref 0.6–4.47)
LYMPHOCYTES NFR BLD AUTO: 17 % (ref 14–44)
MAGNESIUM SERPL-MCNC: 2.2 MG/DL (ref 1.6–2.6)
MCH RBC QN AUTO: 30 PG (ref 26.8–34.3)
MCHC RBC AUTO-ENTMCNC: 33.2 G/DL (ref 31.4–37.4)
MCV RBC AUTO: 90 FL (ref 82–98)
MONOCYTES # BLD AUTO: 0.6 THOUSAND/ΜL (ref 0.17–1.22)
MONOCYTES NFR BLD AUTO: 10 % (ref 4–12)
NEUTROPHILS # BLD AUTO: 4.56 THOUSANDS/ΜL (ref 1.85–7.62)
NEUTS SEG NFR BLD AUTO: 72 % (ref 43–75)
NRBC BLD AUTO-RTO: 0 /100 WBCS
PLATELET # BLD AUTO: 262 THOUSANDS/UL (ref 149–390)
PMV BLD AUTO: 10.2 FL (ref 8.9–12.7)
POTASSIUM SERPL-SCNC: 4 MMOL/L (ref 3.5–5.3)
PROT SERPL-MCNC: 6.9 G/DL (ref 6.4–8.2)
RBC # BLD AUTO: 4.37 MILLION/UL (ref 3.81–5.12)
SODIUM SERPL-SCNC: 139 MMOL/L (ref 136–145)
TROPONIN I SERPL-MCNC: 0.02 NG/ML
TSH SERPL DL<=0.05 MIU/L-ACNC: 2.42 UIU/ML (ref 0.36–3.74)
WBC # BLD AUTO: 6.28 THOUSAND/UL (ref 4.31–10.16)

## 2021-03-14 PROCEDURE — 96360 HYDRATION IV INFUSION INIT: CPT

## 2021-03-14 PROCEDURE — 85025 COMPLETE CBC W/AUTO DIFF WBC: CPT | Performed by: EMERGENCY MEDICINE

## 2021-03-14 PROCEDURE — 84484 ASSAY OF TROPONIN QUANT: CPT | Performed by: EMERGENCY MEDICINE

## 2021-03-14 PROCEDURE — 80048 BASIC METABOLIC PNL TOTAL CA: CPT | Performed by: EMERGENCY MEDICINE

## 2021-03-14 PROCEDURE — 84443 ASSAY THYROID STIM HORMONE: CPT | Performed by: EMERGENCY MEDICINE

## 2021-03-14 PROCEDURE — 99285 EMERGENCY DEPT VISIT HI MDM: CPT

## 2021-03-14 PROCEDURE — 99285 EMERGENCY DEPT VISIT HI MDM: CPT | Performed by: EMERGENCY MEDICINE

## 2021-03-14 PROCEDURE — 83735 ASSAY OF MAGNESIUM: CPT | Performed by: EMERGENCY MEDICINE

## 2021-03-14 PROCEDURE — 36415 COLL VENOUS BLD VENIPUNCTURE: CPT | Performed by: EMERGENCY MEDICINE

## 2021-03-14 PROCEDURE — 93005 ELECTROCARDIOGRAM TRACING: CPT

## 2021-03-14 PROCEDURE — 80076 HEPATIC FUNCTION PANEL: CPT | Performed by: EMERGENCY MEDICINE

## 2021-03-14 RX ORDER — ZOLPIDEM TARTRATE 5 MG/1
5 TABLET ORAL
COMMUNITY

## 2021-03-14 RX ADMIN — SODIUM CHLORIDE 500 ML: 0.9 INJECTION, SOLUTION INTRAVENOUS at 17:37

## 2021-03-14 NOTE — ED NOTES
Notified patient awaiting call back from transport regarding time       Sherry Xie, FARIDA  03/14/21 2893

## 2021-03-14 NOTE — ED NOTES
Called [facility] for transport, stretcher per [employer]  Transport wcb with time       Zulma Hunter RN  03/14/21 9239

## 2021-03-14 NOTE — ED PROVIDER NOTES
History  Chief Complaint   Patient presents with    Syncope     Pt brought in by EMS for syncope  Pt reports that she was walking to the BR and felt dizzy  Staff found patient on the floor in her room  When EMS arrived pt was A&O with GS of 15  Pt BP upon EMS arrival was 89/55  BP after fluid admin 122/60     51 YO female presents from NH for a syncopal episode  Pt states she had just stood up to go to the bathroom when she had lightheadedness  She had a brief LOC, denies striking her head  She denies associated chest pain, palpitations  Pt had some dizziness initially after regaining consciousness, EMS found her blood pressure to be 89/55  Pt states this dizziness has improved and she is currently feeling generally well with no complaints  Pt has a Hx of syncopal episodes in the past, states it has been some time since her last  She additionally has known seizure D/O  Pt denies CP/SOB/F/C/N/V/D/C, no dysuria, burning on urination or blood in urine  History provided by:  Patient   used: No    Syncope  Episode history:  Single  Most recent episode: Today  Timing:  Constant  Progression:  Unchanged  Chronicity:  New  Relieved by:  Nothing  Worsened by:  Nothing  Ineffective treatments:  None tried  Associated symptoms: dizziness    Associated symptoms: no chest pain, no confusion, no fever, no headaches, no shortness of breath, no vomiting and no weakness        Prior to Admission Medications   Prescriptions Last Dose Informant Patient Reported? Taking?    AMLODIPINE BESYLATE PO   Yes Yes   Sig: Take 5 mg by mouth   acetaminophen (TYLENOL) 325 mg tablet   Yes No   Sig: Take 650 mg by mouth every 4 (four) hours as needed for mild pain   aspirin (ECOTRIN LOW STRENGTH) 81 mg EC tablet   Yes Yes   Sig: Take 81 mg by mouth daily   atenolol (TENORMIN) 25 mg tablet  Outside Facility (Specify) Yes Yes   Sig: Take 25 mg by mouth daily   atorvastatin (LIPITOR) 80 mg tablet   Yes Yes   Sig: Take 80 mg by mouth daily   calcium carbonate (TUMS) 500 mg chewable tablet  Outside Facility (Specify) Yes No   Sig: Chew 1 tablet daily   clopidogrel (PLAVIX) 75 mg tablet   Yes Yes   Sig: Take 75 mg by mouth daily   glucose 40 %  Outside Facility (Specify) Yes No   Sig: Take 15 g by mouth once   guaiFENesin (MUCINEX) 600 mg 12 hr tablet  Outside Facility (Specify) Yes No   Sig: Take 1,200 mg by mouth every 12 (twelve) hours   insulin glargine (LANTUS) 100 units/mL subcutaneous injection   Yes No   Sig: Inject under the skin daily at bedtime   insulin lispro protamine-insulin lispro (HumaLOG 50-50) 100 units/mL   Yes No   Sig: Inject under the skin 2 (two) times a day before meals   levETIRAcetam (KEPPRA) 500 mg tablet   No No   Sig: Take 1 tablet by mouth every 12 (twelve) hours for 7 days   losartan (COZAAR) 25 mg tablet  Outside Facility (Specify) Yes No   Sig: Take 50 mg by mouth daily    methimazole (TAPAZOLE) 10 mg tablet  Outside Facility (Specify) Yes No   Sig: Take 10 mg by mouth 3 (three) times a day   methimazole (TAPAZOLE) 5 mg tablet  Outside Facility (Specify) Yes No   Sig: Take 5 mg by mouth 3 (three) times a day   metoclopramide (REGLAN) 5 mg tablet  Outside Facility (Specify) Yes No   Sig: Take 5 mg by mouth 4 (four) times a day   omeprazole (PriLOSEC) 20 mg delayed release capsule   Yes No   Sig: Take 20 mg by mouth daily   sodium chloride (OCEAN) 0 65 % nasal spray  Outside Facility (Specify) Yes No   Si spray into each nostril as needed for congestion   sucralfate (CARAFATE) 1 g tablet  Outside Facility (Specify) Yes No   Sig: Take 1 g by mouth 4 (four) times a day   venlafaxine (EFFEXOR-XR) 75 mg 24 hr capsule  Outside Facility (Specify) Yes No   Sig: Take 75 mg by mouth daily   zolpidem (AMBIEN) 5 mg tablet   Yes Yes   Sig: Take 5 mg by mouth daily at bedtime      Facility-Administered Medications: None       Past Medical History:   Diagnosis Date    Diabetes mellitus ([address] )     Hypertension     Seizures ([facility] )     Type 1 diabetes ([facility] )        History reviewed  No pertinent surgical history  History reviewed  No pertinent family history  I have reviewed and agree with the history as documented  E-Cigarette/Vaping     E-Cigarette/Vaping Substances     Social History     Tobacco Use    Smoking status: Former Smoker     Types: Cigarettes    Smokeless tobacco: Never Used   Substance Use Topics    Alcohol use: No    Drug use: No       Review of Systems   Constitutional: Negative for chills, fatigue and fever  HENT: Negative for dental problem  Eyes: Negative for visual disturbance  Respiratory: Negative for shortness of breath  Cardiovascular: Positive for syncope  Negative for chest pain  Gastrointestinal: Negative for abdominal pain, diarrhea and vomiting  Genitourinary: Negative for dysuria and frequency  Musculoskeletal: Negative for arthralgias  Skin: Negative for rash  Neurological: Positive for dizziness  Negative for weakness, light-headedness and headaches  Psychiatric/Behavioral: Negative for agitation, behavioral problems and confusion  All other systems reviewed and are negative  Physical Exam  Physical Exam  Vitals signs and nursing note reviewed  Constitutional:       Appearance: Normal appearance  HENT:      Head: Normocephalic and atraumatic  Eyes:      Extraocular Movements: Extraocular movements intact  Conjunctiva/sclera: Conjunctivae normal    Neck:      Musculoskeletal: Normal range of motion  Cardiovascular:      Rate and Rhythm: Normal rate  Pulmonary:      Effort: Pulmonary effort is normal    Abdominal:      General: There is no distension  Musculoskeletal: Normal range of motion  Skin:     Findings: No rash  Neurological:      General: No focal deficit present  Mental Status: She is alert  Cranial Nerves: No cranial nerve deficit     Psychiatric:         Mood and Affect: Mood normal          Vital Signs  ED Triage Vitals [03/14/21 1658]   Temperature Pulse Respirations Blood Pressure SpO2   98 2 °F (36 8 °C) 60 18 125/67 98 %      Temp Source Heart Rate Source Patient Position - Orthostatic VS BP Location FiO2 (%)   Oral Monitor Lying Right arm --      Pain Score       --           Vitals:    03/14/21 1658 03/14/21 1900 03/14/21 2000 03/14/21 2100   BP: 125/67 110/57 99/57 99/52   Pulse: 60 60 60 60   Patient Position - Orthostatic VS: Lying Lying Lying Lying         Visual Acuity      ED Medications  Medications   sodium chloride 0 9 % bolus 500 mL (0 mL Intravenous Stopped 3/14/21 1905)       Diagnostic Studies  Results Reviewed     Procedure Component Value Units Date/Time    Hepatic function panel [[id number]]  (Abnormal) Collected: 03/14/21 1736    Lab Status: Final result Specimen: Blood from Arm, Left Updated: 03/14/21 1817     Total Bilirubin 0 29 mg/dL      Bilirubin, Direct 0 09 mg/dL      Alkaline Phosphatase 109 U/L      AST 22 U/L      ALT 36 U/L      Total Protein 6 9 g/dL      Albumin 3 4 g/dL     Magnesium [[id number]]  (Normal) Collected: 03/14/21 1736    Lab Status: Final result Specimen: Blood from Arm, Left Updated: 03/14/21 1817     Magnesium 2 2 mg/dL     TSH [[id number]]  (Normal) Collected: 03/14/21 1736    Lab Status: Final result Specimen: Blood from Arm, Left Updated: 03/14/21 1817     TSH 3RD GENERATON 2 421 uIU/mL     Narrative:      Patients undergoing fluorescein dye angiography may retain small amounts of fluorescein in the body for 48-72 hours post procedure  Samples containing fluorescein can produce falsely depressed TSH values  If the patient had this procedure,a specimen should be resubmitted post fluorescein clearance        Troponin I [[id number]]  (Normal) Collected: 03/14/21 1736    Lab Status: Final result Specimen: Blood from Arm, Left Updated: 03/14/21 1814     Troponin I 0 02 ng/mL     Basic metabolic panel [[id number]]  (Abnormal) Collected: 03/14/21 1736    Lab Status: Final result Specimen: Blood from Arm, Left Updated: 03/14/21 1808     Sodium 139 mmol/L      Potassium 4 0 mmol/L      Chloride 105 mmol/L      CO2 26 mmol/L      ANION GAP 8 mmol/L      BUN 28 mg/dL      Creatinine 1 39 mg/dL      Glucose 180 mg/dL      Calcium 10 0 mg/dL      eGFR 43 ml/min/1 73sq m     Narrative:      [place] guidelines for Chronic Kidney Disease (CKD):     Stage 1 with normal or high GFR (GFR > 90 mL/min/1 73 square meters)    Stage 2 Mild CKD (GFR = 60-89 mL/min/1 73 square meters)    Stage 3A Moderate CKD (GFR = 45-59 mL/min/1 73 square meters)    Stage 3B Moderate CKD (GFR = 30-44 mL/min/1 73 square meters)    Stage 4 Severe CKD (GFR = 15-29 mL/min/1 73 square meters)    Stage 5 End Stage CKD (GFR <15 mL/min/1 73 square meters)  Note: GFR calculation is accurate only with a steady state creatinine    CBC and differential [[id number]] Collected: 03/14/21 1736    Lab Status: Final result Specimen: Blood from Arm, Left Updated: 03/14/21 1749     WBC 6 28 Thousand/uL      RBC 4 37 Million/uL      Hemoglobin 13 1 g/dL      Hematocrit 39 5 %      MCV 90 fL      MCH 30 0 pg      MCHC 33 2 g/dL      RDW 12 0 %      MPV 10 2 fL      Platelets 369 Thousands/uL      nRBC 0 /100 WBCs      Neutrophils Relative 72 %      Immat GRANS % 0 %      Lymphocytes Relative 17 %      Monocytes Relative 10 %      Eosinophils Relative 1 %      Basophils Relative 0 %      Neutrophils Absolute 4 56 Thousands/µL      Immature Grans Absolute 0 01 Thousand/uL      Lymphocytes Absolute 1 06 Thousands/µL      Monocytes Absolute 0 60 Thousand/µL      Eosinophils Absolute 0 03 Thousand/µL      Basophils Absolute 0 02 Thousands/µL                  No orders to display              Procedures  ECG 12 Lead Documentation Only    Date/Time: 3/14/2021 6:29 PM  Performed by: Dayna Adkins MD  Authorized by: Dayna Adkins MD     ECG reviewed by me, the ED Provider: yes    Patient location:  ED  Previous ECG: Previous ECG:  Compared to current    Comparison ECG info:  5/26/2019    Similarity:  No change  Interpretation:     Interpretation: normal    Rate:     ECG rate:  59    ECG rate assessment: bradycardic    Rhythm:     Rhythm: sinus rhythm and sinus bradycardia    QRS:     QRS axis:  Normal    QRS intervals:  Normal  Conduction:     Conduction: abnormal      Abnormal conduction: bifascicular block    ST segments:     ST segments:  Normal  T waves:     T waves: normal               ED Course                             SBIRT 20yo+      Most Recent Value   SBIRT (22 yo +)   In order to provide better care to our patients, we are screening all of our patients for alcohol and drug use  Would it be okay to ask you these screening questions? Yes Filed at: 03/14/2021 1737   Initial Alcohol Screen: US AUDIT-C    1  How often do you have a drink containing alcohol?  0 Filed at: 03/14/2021 1737   2  How many drinks containing alcohol do you have on a typical day you are drinking? 0 Filed at: 03/14/2021 1737   3a  Male UNDER 65: How often do you have five or more drinks on one occasion? 0 Filed at: 03/14/2021 1737   3b  FEMALE Any Age, or MALE 65+: How often do you have 4 or more drinks on one occassion? 0 Filed at: 03/14/2021 1737   Audit-C Score  0 Filed at: 03/14/2021 1737   VITO: How many times in the past year have you    Used an illegal drug or used a prescription medication for non-medical reasons? Never Filed at: 03/14/2021 1737                    MDM  Number of Diagnoses or Management Options  Acute kidney injury [facility]): new and requires workup  Syncope: new and requires workup  Diagnosis management comments: 1  Syncope - Pt with Hx of similar in the past, preceding lightheadedness which has resolved and Pt is currently feeling well  Will check ECG and troponin, CBC for anemia, metabolic panel for electrolyte abnormalities and dehydration  Will give light hydration         Amount and/or Complexity of Data Reviewed  Clinical lab tests: ordered and reviewed  Tests in the radiology section of CPT®: ordered and reviewed  Review and summarize past medical records: yes  Independent visualization of images, tracings, or specimens: yes    Patient Progress  Patient progress: improved      Disposition  Final diagnoses:   Syncope   Acute kidney injury ([address] )     Time reflects when diagnosis was documented in both MDM as applicable and the Disposition within this note     Time User Action Codes Description Comment    3/14/2021  6:46 PM Thiago CALDERÓN Add [R55] Syncope     3/14/2021  6:46 PM Thiago CALDERÓN Add [N17 9] Acute kidney injury [facility])       ED Disposition     ED Disposition Condition Date/Time Comment    Discharge Stable Sun Mar 14, 2021  6:46 PM Anil Stands discharge to home/self care              Follow-up Information     Follow up With Specialties Details Why Contact Info    Leyda Che MD Infectious Diseases   [id number]  [address] [place] [address]            Discharge Medication List as of 3/14/2021  6:48 PM      CONTINUE these medications which have NOT CHANGED    Details   AMLODIPINE BESYLATE PO Take 5 mg by mouth, Historical Med      aspirin (ECOTRIN LOW STRENGTH) 81 mg EC tablet Take 81 mg by mouth daily, Historical Med      atenolol (TENORMIN) 25 mg tablet Take 25 mg by mouth daily, Historical Med      atorvastatin (LIPITOR) 80 mg tablet Take 80 mg by mouth daily, Historical Med      clopidogrel (PLAVIX) 75 mg tablet Take 75 mg by mouth daily, Historical Med      zolpidem (AMBIEN) 5 mg tablet Take 5 mg by mouth daily at bedtime, Historical Med      acetaminophen (TYLENOL) 325 mg tablet Take 650 mg by mouth every 4 (four) hours as needed for mild pain, Historical Med      calcium carbonate (TUMS) 500 mg chewable tablet Chew 1 tablet daily, Historical Med      glucose 40 % Take 15 g by mouth once, Historical Med      guaiFENesin (MUCINEX) 600 mg 12 hr tablet Take 1,200 mg by mouth every 12 (twelve) hours, Historical Med      insulin glargine (LANTUS) 100 units/mL subcutaneous injection Inject under the skin daily at bedtime, Historical Med      insulin lispro protamine-insulin lispro (HumaLOG 50-50) 100 units/mL Inject under the skin 2 (two) times a day before meals, Historical Med      levETIRAcetam (KEPPRA) 500 mg tablet Take 1 tablet by mouth every 12 (twelve) hours for 7 days, Starting Tue 7/25/2017, Until Tue 8/1/2017, Print      losartan (COZAAR) 25 mg tablet Take 50 mg by mouth daily , Historical Med      !! methimazole (TAPAZOLE) 10 mg tablet Take 10 mg by mouth 3 (three) times a day, Historical Med      !! methimazole (TAPAZOLE) 5 mg tablet Take 5 mg by mouth 3 (three) times a day, Historical Med      metoclopramide (REGLAN) 5 mg tablet Take 5 mg by mouth 4 (four) times a day, Historical Med      omeprazole (PriLOSEC) 20 mg delayed release capsule Take 20 mg by mouth daily, Historical Med      sodium chloride (OCEAN) 0 65 % nasal spray 1 spray into each nostril as needed for congestion, Historical Med      sucralfate (CARAFATE) 1 g tablet Take 1 g by mouth 4 (four) times a day, Historical Med      venlafaxine (EFFEXOR-XR) 75 mg 24 hr capsule Take 75 mg by mouth daily, Historical Med       !! - Potential duplicate medications found  Please discuss with provider  No discharge procedures on file      PDMP Review     None          ED Provider  Electronically Signed by           Dick Galeana MD  03/14/21 0179

## 2021-03-14 NOTE — DISCHARGE INSTRUCTIONS
Ms Michael Miller has a mild acute kidney injury, she should try to drink more fluids  Her other labwork was appropriate, she should follow up with the primary doctor for further evaluation and management

## 2021-03-14 NOTE — ED NOTES
Called [facility] and spoke with [facility] regarding what form of transport would be appropriate for the patient  She indicated that the patient would need an attendant with her so stretcher would be most appropriate       Gabby Bazzi RN  03/14/21 4511

## 2021-03-15 LAB
ATRIAL RATE: 59 BPM
P AXIS: 60 DEGREES
PR INTERVAL: 154 MS
QRS AXIS: -74 DEGREES
QRSD INTERVAL: 154 MS
QT INTERVAL: 448 MS
QTC INTERVAL: 443 MS
T WAVE AXIS: -12 DEGREES
VENTRICULAR RATE: 59 BPM

## 2021-03-15 PROCEDURE — 93010 ELECTROCARDIOGRAM REPORT: CPT | Performed by: INTERNAL MEDICINE

## 2021-03-15 NOTE — ED NOTES
Spoke to Lani Todd at [facility] for an update regarding transport  He stated the patient was not in their system  He took all of the information again and stated he would call back with a  time        Zulma Hunter RN  03/14/21 2051

## 2021-03-20 ENCOUNTER — HOSPITAL ENCOUNTER (INPATIENT)
Facility: HOSPITAL | Age: 56
LOS: 4 days | Discharge: NURSING, REHAB OR LONG-TERM CARE | DRG: 420 | End: 2021-03-24
Attending: EMERGENCY MEDICINE | Admitting: INTERNAL MEDICINE
Payer: COMMERCIAL

## 2021-03-20 ENCOUNTER — APPOINTMENT (EMERGENCY)
Dept: CT IMAGING | Facility: HOSPITAL | Age: 56
DRG: 420 | End: 2021-03-20
Payer: COMMERCIAL

## 2021-03-20 DIAGNOSIS — E10.22 TYPE 1 DIABETES MELLITUS WITH STAGE 3 CHRONIC KIDNEY DISEASE, UNSPECIFIED WHETHER STAGE 3A OR 3B CKD (HCC): ICD-10-CM

## 2021-03-20 DIAGNOSIS — E11.65 HYPEROSMOLAR HYPERGLYCEMIC STATE (HHS) (HCC): Primary | ICD-10-CM

## 2021-03-20 DIAGNOSIS — E11.00 HYPEROSMOLAR HYPERGLYCEMIC STATE (HHS) (HCC): Primary | ICD-10-CM

## 2021-03-20 DIAGNOSIS — E11.01 HHNC (HYPERGLYCEMIC HYPEROSMOLAR NONKETOTIC COMA) (HCC): ICD-10-CM

## 2021-03-20 DIAGNOSIS — E05.00 GRAVES DISEASE: ICD-10-CM

## 2021-03-20 DIAGNOSIS — N18.30 TYPE 1 DIABETES MELLITUS WITH STAGE 3 CHRONIC KIDNEY DISEASE, UNSPECIFIED WHETHER STAGE 3A OR 3B CKD (HCC): ICD-10-CM

## 2021-03-20 PROBLEM — N17.9 AKI (ACUTE KIDNEY INJURY) (HCC): Status: ACTIVE | Noted: 2021-03-20

## 2021-03-20 PROBLEM — E10.9 TYPE I DIABETES MELLITUS (HCC): Status: ACTIVE | Noted: 2021-03-20

## 2021-03-20 PROBLEM — N18.9 CKD (CHRONIC KIDNEY DISEASE): Status: ACTIVE | Noted: 2021-03-20

## 2021-03-20 PROBLEM — E87.1 HYPONATREMIA: Status: ACTIVE | Noted: 2021-03-20

## 2021-03-20 PROBLEM — I10 HYPERTENSION: Status: ACTIVE | Noted: 2021-03-20

## 2021-03-20 PROBLEM — H11.30 SUBCONJUNCTIVAL HEMORRHAGE: Status: ACTIVE | Noted: 2021-03-20

## 2021-03-20 PROBLEM — I25.10 CORONARY ARTERY DISEASE: Status: ACTIVE | Noted: 2021-03-20

## 2021-03-20 PROBLEM — I44.2 COMPLETE HEART BLOCK (HCC): Status: ACTIVE | Noted: 2021-03-20

## 2021-03-20 LAB
ALBUMIN SERPL BCP-MCNC: 3.6 G/DL (ref 3.5–5)
ALP SERPL-CCNC: 128 U/L (ref 46–116)
ALT SERPL W P-5'-P-CCNC: 26 U/L (ref 12–78)
ANION GAP SERPL CALCULATED.3IONS-SCNC: 10 MMOL/L (ref 4–13)
AST SERPL W P-5'-P-CCNC: 10 U/L (ref 5–45)
BASE EX.OXY STD BLDV CALC-SCNC: 94.4 % (ref 60–80)
BASE EXCESS BLDV CALC-SCNC: -1.8 MMOL/L
BETA-HYDROXYBUTYRATE: 0.5 MMOL/L
BILIRUB SERPL-MCNC: 0.39 MG/DL (ref 0.2–1)
BUN SERPL-MCNC: 31 MG/DL (ref 5–25)
CALCIUM SERPL-MCNC: 9.4 MG/DL (ref 8.3–10.1)
CHLORIDE SERPL-SCNC: 91 MMOL/L (ref 100–108)
CO2 SERPL-SCNC: 24 MMOL/L (ref 21–32)
CREAT SERPL-MCNC: 1.74 MG/DL (ref 0.6–1.3)
ERYTHROCYTE [DISTWIDTH] IN BLOOD BY AUTOMATED COUNT: 11.9 % (ref 11.6–15.1)
GFR SERPL CREATININE-BSD FRML MDRD: 33 ML/MIN/1.73SQ M
GLUCOSE SERPL-MCNC: 946 MG/DL (ref 65–140)
GLUCOSE SERPL-MCNC: >500 MG/DL (ref 65–140)
GLUCOSE SERPL-MCNC: >500 MG/DL (ref 65–140)
HCO3 BLDV-SCNC: 21.9 MMOL/L (ref 24–30)
HCT VFR BLD AUTO: 37 % (ref 34.8–46.1)
HGB BLD-MCNC: 12.3 G/DL (ref 11.5–15.4)
MCH RBC QN AUTO: 30.2 PG (ref 26.8–34.3)
MCHC RBC AUTO-ENTMCNC: 33.2 G/DL (ref 31.4–37.4)
MCV RBC AUTO: 91 FL (ref 82–98)
O2 CT BLDV-SCNC: 17.4 ML/DL
PCO2 BLDV: 34.1 MM HG (ref 42–50)
PH BLDV: 7.43 [PH] (ref 7.3–7.4)
PLATELET # BLD AUTO: 270 THOUSANDS/UL (ref 149–390)
PMV BLD AUTO: 10.3 FL (ref 8.9–12.7)
PO2 BLDV: 81.1 MM HG (ref 35–45)
POTASSIUM SERPL-SCNC: 5.6 MMOL/L (ref 3.5–5.3)
PROT SERPL-MCNC: 7.2 G/DL (ref 6.4–8.2)
RBC # BLD AUTO: 4.07 MILLION/UL (ref 3.81–5.12)
SODIUM SERPL-SCNC: 125 MMOL/L (ref 136–145)
TROPONIN I SERPL-MCNC: <0.02 NG/ML
TSH SERPL DL<=0.05 MIU/L-ACNC: 0.88 UIU/ML (ref 0.36–3.74)
WBC # BLD AUTO: 6.32 THOUSAND/UL (ref 4.31–10.16)

## 2021-03-20 PROCEDURE — 99285 EMERGENCY DEPT VISIT HI MDM: CPT

## 2021-03-20 PROCEDURE — 70450 CT HEAD/BRAIN W/O DYE: CPT

## 2021-03-20 PROCEDURE — 80053 COMPREHEN METABOLIC PANEL: CPT | Performed by: EMERGENCY MEDICINE

## 2021-03-20 PROCEDURE — 82948 REAGENT STRIP/BLOOD GLUCOSE: CPT

## 2021-03-20 PROCEDURE — 87040 BLOOD CULTURE FOR BACTERIA: CPT | Performed by: INTERNAL MEDICINE

## 2021-03-20 PROCEDURE — 99285 EMERGENCY DEPT VISIT HI MDM: CPT | Performed by: EMERGENCY MEDICINE

## 2021-03-20 PROCEDURE — 99291 CRITICAL CARE FIRST HOUR: CPT | Performed by: INTERNAL MEDICINE

## 2021-03-20 PROCEDURE — 84443 ASSAY THYROID STIM HORMONE: CPT | Performed by: INTERNAL MEDICINE

## 2021-03-20 PROCEDURE — 96360 HYDRATION IV INFUSION INIT: CPT

## 2021-03-20 PROCEDURE — 85027 COMPLETE CBC AUTOMATED: CPT | Performed by: EMERGENCY MEDICINE

## 2021-03-20 PROCEDURE — 82010 KETONE BODYS QUAN: CPT | Performed by: EMERGENCY MEDICINE

## 2021-03-20 PROCEDURE — 84484 ASSAY OF TROPONIN QUANT: CPT | Performed by: INTERNAL MEDICINE

## 2021-03-20 PROCEDURE — 36415 COLL VENOUS BLD VENIPUNCTURE: CPT | Performed by: EMERGENCY MEDICINE

## 2021-03-20 PROCEDURE — 82805 BLOOD GASES W/O2 SATURATION: CPT | Performed by: EMERGENCY MEDICINE

## 2021-03-20 RX ORDER — SODIUM CHLORIDE 9 MG/ML
125 INJECTION, SOLUTION INTRAVENOUS CONTINUOUS
Status: CANCELLED | OUTPATIENT
Start: 2021-03-20

## 2021-03-20 RX ORDER — ATORVASTATIN CALCIUM 80 MG/1
80 TABLET, FILM COATED ORAL EVERY EVENING
Status: DISCONTINUED | OUTPATIENT
Start: 2021-03-21 | End: 2021-03-24 | Stop reason: HOSPADM

## 2021-03-20 RX ORDER — METHIMAZOLE 5 MG/1
10 TABLET ORAL EVERY 12 HOURS SCHEDULED
Status: DISCONTINUED | OUTPATIENT
Start: 2021-03-20 | End: 2021-03-24 | Stop reason: HOSPADM

## 2021-03-20 RX ORDER — ATENOLOL 25 MG/1
25 TABLET ORAL DAILY
Status: DISCONTINUED | OUTPATIENT
Start: 2021-03-21 | End: 2021-03-24 | Stop reason: HOSPADM

## 2021-03-20 RX ORDER — LOSARTAN POTASSIUM 50 MG/1
50 TABLET ORAL DAILY
Status: DISCONTINUED | OUTPATIENT
Start: 2021-03-21 | End: 2021-03-20

## 2021-03-20 RX ORDER — PANTOPRAZOLE SODIUM 20 MG/1
20 TABLET, DELAYED RELEASE ORAL
Status: DISCONTINUED | OUTPATIENT
Start: 2021-03-21 | End: 2021-03-24 | Stop reason: HOSPADM

## 2021-03-20 RX ORDER — SODIUM CHLORIDE 9 MG/ML
75 INJECTION, SOLUTION INTRAVENOUS CONTINUOUS
Status: DISCONTINUED | OUTPATIENT
Start: 2021-03-20 | End: 2021-03-22

## 2021-03-20 RX ORDER — METHIMAZOLE 5 MG/1
5 TABLET ORAL 3 TIMES DAILY
Status: DISCONTINUED | OUTPATIENT
Start: 2021-03-20 | End: 2021-03-20

## 2021-03-20 RX ORDER — ASPIRIN 81 MG/1
81 TABLET ORAL DAILY
Status: DISCONTINUED | OUTPATIENT
Start: 2021-03-21 | End: 2021-03-24 | Stop reason: HOSPADM

## 2021-03-20 RX ORDER — AMLODIPINE BESYLATE 5 MG/1
5 TABLET ORAL DAILY
Status: DISCONTINUED | OUTPATIENT
Start: 2021-03-21 | End: 2021-03-24 | Stop reason: HOSPADM

## 2021-03-20 RX ORDER — CLOPIDOGREL BISULFATE 75 MG/1
75 TABLET ORAL DAILY
Status: DISCONTINUED | OUTPATIENT
Start: 2021-03-21 | End: 2021-03-24 | Stop reason: HOSPADM

## 2021-03-20 RX ORDER — ACETAMINOPHEN 325 MG/1
650 TABLET ORAL EVERY 6 HOURS PRN
Status: DISCONTINUED | OUTPATIENT
Start: 2021-03-20 | End: 2021-03-21

## 2021-03-20 RX ORDER — VENLAFAXINE HYDROCHLORIDE 75 MG/1
75 CAPSULE, EXTENDED RELEASE ORAL DAILY
Status: DISCONTINUED | OUTPATIENT
Start: 2021-03-21 | End: 2021-03-24 | Stop reason: HOSPADM

## 2021-03-20 RX ORDER — ONDANSETRON 2 MG/ML
4 INJECTION INTRAMUSCULAR; INTRAVENOUS ONCE AS NEEDED
Status: DISCONTINUED | OUTPATIENT
Start: 2021-03-20 | End: 2021-03-24 | Stop reason: HOSPADM

## 2021-03-20 RX ADMIN — METHIMAZOLE 10 MG: 5 TABLET ORAL at 23:11

## 2021-03-20 RX ADMIN — SODIUM CHLORIDE 1000 ML: 0.9 INJECTION, SOLUTION INTRAVENOUS at 20:39

## 2021-03-20 RX ADMIN — SODIUM CHLORIDE 1000 ML: 0.9 INJECTION, SOLUTION INTRAVENOUS at 19:57

## 2021-03-20 RX ADMIN — SODIUM CHLORIDE 150 ML/HR: 0.9 INJECTION, SOLUTION INTRAVENOUS at 23:05

## 2021-03-20 RX ADMIN — SODIUM CHLORIDE 21 UNITS/HR: 9 INJECTION, SOLUTION INTRAVENOUS at 21:26

## 2021-03-20 NOTE — ED PROVIDER NOTES
History  Chief Complaint   Patient presents with    Hyperglycemia - Symptomatic     pt reports high blood sugar at the [facility] (600bs), pt reports dry mouht as only complaint     Pt is a 50year old female with a PMH of type I DM, hypertension, seizure disorder presenting with hyperglycemia  Pt states that other than having dry mouth, she has not had many symptoms  She states she did fall and strike her head 3 days ago in the nursing home  Denies LOC but is on ASA and Plavix  She does have right subconjunctival hemorrhage but denies headache, lightheadedness, dizziness, n/v, neck pain  Pt states she has been compliant with her insulin, but her glucose is still >500  History provided by:  Patient   used: No    Hyperglycemia - Symptomatic  Blood sugar level PTA:  >500  Severity:  Severe  Onset quality:  Gradual  Timing:  Constant  Progression:  Worsening  Chronicity:  New  Diabetes status:  Controlled with insulin  Current diabetic therapy:  Humalog and Lantus  Context: not change in medication, not new diabetes diagnosis, not noncompliance, not recent change in diet and not recent illness    Relieved by:  Nothing  Ineffective treatments:  Insulin  Risk factors: hx of DKA    Risk factors: no obesity        Prior to Admission Medications   Prescriptions Last Dose Informant Patient Reported? Taking?    AMLODIPINE BESYLATE PO   Yes Yes   Sig: Take 5 mg by mouth   acetaminophen (TYLENOL) 325 mg tablet   Yes Yes   Sig: Take 650 mg by mouth every 4 (four) hours as needed for mild pain   aspirin (ECOTRIN LOW STRENGTH) 81 mg EC tablet   Yes Yes   Sig: Take 81 mg by mouth daily   atenolol (TENORMIN) 25 mg tablet  Outside Facility (Specify) Yes Yes   Sig: Take 25 mg by mouth daily   atorvastatin (LIPITOR) 80 mg tablet   Yes Yes   Sig: Take 80 mg by mouth daily   calcium carbonate (TUMS) 500 mg chewable tablet  Outside Facility (Specify) Yes Yes   Sig: Chew 1 tablet daily   clopidogrel (PLAVIX) 75 mg tablet   Yes Yes   Sig: Take 75 mg by mouth daily   glucose 40 %  Outside Facility (Specify) Yes Yes   Sig: Take 15 g by mouth once   guaiFENesin (MUCINEX) 600 mg 12 hr tablet Not Taking at Unknown time Outside Facility (Specify) Yes No   Sig: Take 1,200 mg by mouth every 12 (twelve) hours   insulin glargine (LANTUS) 100 units/mL subcutaneous injection   Yes Yes   Sig: Inject under the skin daily at bedtime   insulin lispro protamine-insulin lispro (HumaLOG 50-50) 100 units/mL   Yes Yes   Sig: Inject under the skin 2 (two) times a day before meals   levETIRAcetam (KEPPRA) 500 mg tablet   No No   Sig: Take 1 tablet by mouth every 12 (twelve) hours for 7 days   losartan (COZAAR) 25 mg tablet  Outside Facility (Specify) Yes Yes   Sig: Take 50 mg by mouth daily    methimazole (TAPAZOLE) 10 mg tablet  Outside Facility (Specify) Yes Yes   Sig: Take 10 mg by mouth 3 (three) times a day   methimazole (TAPAZOLE) 5 mg tablet  Outside Facility (Specify) Yes Yes   Sig: Take 5 mg by mouth 3 (three) times a day   metoclopramide (REGLAN) 5 mg tablet  Outside Facility (Specify) Yes Yes   Sig: Take 5 mg by mouth 4 (four) times a day   omeprazole (PriLOSEC) 20 mg delayed release capsule   Yes Yes   Sig: Take 20 mg by mouth daily   sodium chloride (OCEAN) 0 65 % nasal spray Not Taking at Unknown time Outside Facility (Specify) Yes No   Si spray into each nostril as needed for congestion   sucralfate (CARAFATE) 1 g tablet  Outside Facility (Specify) Yes Yes   Sig: Take 1 g by mouth 4 (four) times a day   venlafaxine (EFFEXOR-XR) 75 mg 24 hr capsule  Outside Facility (Specify) Yes Yes   Sig: Take 75 mg by mouth daily   zolpidem (AMBIEN) 5 mg tablet   Yes Yes   Sig: Take 5 mg by mouth daily at bedtime      Facility-Administered Medications: None       Past Medical History:   Diagnosis Date    Diabetes mellitus ([facility] [address] )     Hypertension     Seizures (HCC)     Type 1 diabetes ([facility] )        History reviewed   No pertinent surgical history  History reviewed  No pertinent family history  I have reviewed and agree with the history as documented  E-Cigarette/Vaping     E-Cigarette/Vaping Substances     Social History     Tobacco Use    Smoking status: Former Smoker     Types: Cigarettes    Smokeless tobacco: Never Used   Substance Use Topics    Alcohol use: No    Drug use: No       Review of Systems   Constitutional: Negative  HENT: Negative  Eyes: Positive for redness  Negative for photophobia, pain, discharge and visual disturbance  Respiratory: Negative  Cardiovascular: Negative  Gastrointestinal: Negative  Genitourinary: Negative  Musculoskeletal: Negative  Neurological: Negative  All other systems reviewed and are negative  Physical Exam  Physical Exam  Constitutional:       General: She is in acute distress  Appearance: She is well-developed  She is not ill-appearing, toxic-appearing or diaphoretic  HENT:      Head: Normocephalic and atraumatic  Right Ear: External ear normal       Left Ear: External ear normal       Nose: Nose normal    Eyes:      General: No scleral icterus  Right eye: No discharge  Left eye: No discharge  Extraocular Movements: Extraocular movements intact  Conjunctiva/sclera:      Right eye: Hemorrhage present  Pupils: Pupils are equal, round, and reactive to light  Neck:      Musculoskeletal: Normal range of motion and neck supple  Cardiovascular:      Rate and Rhythm: Normal rate and regular rhythm  Heart sounds: Normal heart sounds  Pulmonary:      Effort: Pulmonary effort is normal       Breath sounds: Normal breath sounds  Musculoskeletal: Normal range of motion  Skin:     General: Skin is warm and dry  Neurological:      General: No focal deficit present  Mental Status: She is alert and oriented to person, place, and time  GCS: GCS eye subscore is 4  GCS verbal subscore is 5  GCS motor subscore is 6  Cranial Nerves: Cranial nerves are intact  Sensory: Sensation is intact  No sensory deficit  Motor: Motor function is intact     Psychiatric:         Mood and Affect: Mood normal          Speech: Speech normal          Behavior: Behavior normal          Vital Signs  ED Triage Vitals   Temperature Pulse Respirations Blood Pressure SpO2   03/20/21 1939 03/20/21 1939 03/20/21 1939 03/20/21 1939 03/20/21 1939   98 2 °F (36 8 °C) 75 16 151/67 97 %      Temp Source Heart Rate Source Patient Position - Orthostatic VS BP Location FiO2 (%)   03/20/21 1939 03/20/21 2129 03/20/21 2129 03/20/21 2129 --   Oral Monitor Lying Right arm       Pain Score       --                  Vitals:    03/20/21 1939 03/20/21 2129   BP: 151/67 135/65   Pulse: 75 67   Patient Position - Orthostatic VS:  Lying         Visual Acuity      ED Medications  Medications   insulin regular (HumuLIN R,NovoLIN R) 1 Units/mL in sodium chloride 0 9 % 100 mL infusion (21 Units/hr Intravenous New Bag 3/20/21 2126)   amLODIPine (NORVASC) tablet 5 mg (has no administration in time range)   aspirin (ECOTRIN LOW STRENGTH) EC tablet 81 mg (has no administration in time range)   atenolol (TENORMIN) tablet 25 mg (has no administration in time range)   atorvastatin (LIPITOR) tablet 80 mg (has no administration in time range)   clopidogrel (PLAVIX) tablet 75 mg (has no administration in time range)   pantoprazole (PROTONIX) EC tablet 20 mg (has no administration in time range)   venlafaxine (EFFEXOR-XR) 24 hr capsule 75 mg (has no administration in time range)   sodium chloride 0 9 % infusion (has no administration in time range)   ondansetron (ZOFRAN) injection 4 mg (has no administration in time range)   acetaminophen (TYLENOL) tablet 650 mg (has no administration in time range)   methimazole (TAPAZOLE) tablet 10 mg (has no administration in time range)   sodium chloride 0 9 % bolus 1,000 mL (0 mL Intravenous Stopped 3/20/21 2038)   sodium chloride 0 9 % bolus 1,000 mL (0 mL Intravenous Stopped 3/20/21 2139)       Diagnostic Studies  Results Reviewed     Procedure Component Value Units Date/Time    Troponin I [[id number]] Collected: 03/20/21 2128    Lab Status: In process Specimen: Blood from Arm, Left Updated: 03/20/21 2137    Blood culture [[id number]] Collected: 03/20/21 2128    Lab Status: In process Specimen: Blood from Hand, Right Updated: 03/20/21 2134    Blood culture [[id number]] Collected: 03/20/21 2128    Lab Status: In process Specimen: Blood from Hand, Left Updated: 03/20/21 2134    TSH, 3rd generation with Free T4 reflex [[id number]]  (Normal) Collected: 03/20/21 1947    Lab Status: Final result Specimen: Blood from Arm, Left Updated: 03/20/21 2124     TSH 3RD GENERATON 0 880 uIU/mL     Narrative:      Patients undergoing fluorescein dye angiography may retain small amounts of fluorescein in the body for 48-72 hours post procedure  Samples containing fluorescein can produce falsely depressed TSH values  If the patient had this procedure,a specimen should be resubmitted post fluorescein clearance        Troponin I [[id number]]     Lab Status: No result Specimen: Blood     Comprehensive metabolic panel [[id number]]  (Abnormal) Collected: 03/20/21 1947    Lab Status: Final result Specimen: Blood from Arm, Left Updated: 03/20/21 2035     Sodium 125 mmol/L      Potassium 5 6 mmol/L      Chloride 91 mmol/L      CO2 24 mmol/L      ANION GAP 10 mmol/L      BUN 31 mg/dL      Creatinine 1 74 mg/dL      Glucose 946 mg/dL      Calcium 9 4 mg/dL      AST 10 U/L      ALT 26 U/L      Alkaline Phosphatase 128 U/L      Total Protein 7 2 g/dL      Albumin 3 6 g/dL      Total Bilirubin 0 39 mg/dL      eGFR 33 ml/min/1 73sq m     Narrative:      [place] guidelines for Chronic Kidney Disease (CKD):     Stage 1 with normal or high GFR (GFR > 90 mL/min/1 73 square meters)    Stage 2 Mild CKD (GFR = 60-89 mL/min/1 73 square meters)    Stage 3A Moderate CKD (GFR = 45-59 mL/min/1 73 square meters)    Stage 3B Moderate CKD (GFR = 30-44 mL/min/1 73 square meters)    Stage 4 Severe CKD (GFR = 15-29 mL/min/1 73 square meters)    Stage 5 End Stage CKD (GFR <15 mL/min/1 73 square meters)  Note: GFR calculation is accurate only with a steady state creatinine    Blood gas, venous [[id number]]  (Abnormal) Collected: 03/20/21 1947    Lab Status: Final result Specimen: Blood from Arm, Left Updated: 03/20/21 1956     pH, Rogelio 7 426     pCO2, Rogelio 34 1 mm Hg      pO2, Rogelio 81 1 mm Hg      HCO3, Rogelio 21 9 mmol/L      Base Excess, Rogelio -1 8 mmol/L      O2 Content, Rogelio 17 4 ml/dL      O2 HGB, VENOUS 94 4 %     Beta Hydroxybutyrate [[id number]]  (Normal) Collected: 03/20/21 1947    Lab Status: Final result Specimen: Blood from Arm, Left Updated: 03/20/21 1955     BETA-HYDROXYBUTYRATE 0 5 mmol/L     CBC [[id number]]  (Normal) Collected: 03/20/21 1947    Lab Status: Final result Specimen: Blood from Arm, Left Updated: 03/20/21 1953     WBC 6 32 Thousand/uL      RBC 4 07 Million/uL      Hemoglobin 12 3 g/dL      Hematocrit 37 0 %      MCV 91 fL      MCH 30 2 pg      MCHC 33 2 g/dL      RDW 11 9 %      Platelets 124 Thousands/uL      MPV 10 3 fL     Fingerstick Glucose (POCT) [[id number]]  (Abnormal) Collected: 03/20/21 1941    Lab Status: Final result Updated: 03/20/21 1941     POC Glucose >500 mg/dl                  CT head without contrast   Final Result by Alyssa Clay DO (03/20 2041)      No acute intracranial abnormality  Workstation performed: [id number]                    Procedures  Procedures         ED Course  ED Course as of Mar 20 2155   Sat Mar 20, 2021   2003 DKA labs ordered for hyperglycemia in type I patient  Vitals are stable  Only complaint was dry mouth  Will CT head for trauma after fall and head strike, on thinners  Non-focal neuro exam       2037 No DKA noted, however, pt has glucose of 946  Will admit to [facility] for [address]  SBIRT 22yo+      Most Recent Value   SBIRT (24 yo +)   In order to provide better care to our patients, we are screening all of our patients for alcohol and drug use  Would it be okay to ask you these screening questions? No Filed at: 03/20/2021 1943                    [facility]  Number of Diagnoses or Management Options  HHNC (hyperglycemic hyperosmolar nonketotic coma) ([facility] ): new and requires workup     Amount and/or Complexity of Data Reviewed  Clinical lab tests: ordered and reviewed  Tests in the radiology section of CPT®: ordered and reviewed  Tests in the medicine section of CPT®: ordered and reviewed  Discussion of test results with the performing providers: yes  Discuss the patient with other providers: yes  Independent visualization of images, tracings, or specimens: yes    Risk of Complications, Morbidity, and/or Mortality  Presenting problems: high  Management options: high    Patient Progress  Patient progress: stable      Disposition  Final diagnoses: 201[address] [facility] (hyperglycemic hyperosmolar nonketotic coma) ([facility] )     Time reflects when diagnosis was documented in both MDM as applicable and the Disposition within this note     Time User Action Codes Description Comment    3/20/2021  8:41 PM Galo Esparza [E11 00,  E11 65] Hyperosmolar hyperglycemic state (HHS) ([facility] )     3/20/2021  8:42 PM Rufino Gay [E11 01] [address] [facility] (hyperglycemic hyperosmolar nonketotic coma) [facility])       ED Disposition     ED Disposition Condition Date/Time Comment    Admit Stable Sat Mar 20, 2021  8:44 PM Case was discussed with GRISEL and the patient's admission status was agreed to be Admission Status: inpatient status to the service of Dr Shannon Miranda           Follow-up Information    None         Current Discharge Medication List      CONTINUE these medications which have NOT CHANGED    Details   acetaminophen (TYLENOL) 325 mg tablet Take 650 mg by mouth every 4 (four) hours as needed for mild pain      AMLODIPINE BESYLATE PO Take 5 mg by mouth      aspirin (ECOTRIN LOW STRENGTH) 81 mg EC tablet Take 81 mg by mouth daily      atenolol (TENORMIN) 25 mg tablet Take 25 mg by mouth daily      atorvastatin (LIPITOR) 80 mg tablet Take 80 mg by mouth daily      calcium carbonate (TUMS) 500 mg chewable tablet Chew 1 tablet daily      clopidogrel (PLAVIX) 75 mg tablet Take 75 mg by mouth daily      glucose 40 % Take 15 g by mouth once      insulin glargine (LANTUS) 100 units/mL subcutaneous injection Inject under the skin daily at bedtime      insulin lispro protamine-insulin lispro (HumaLOG 50-50) 100 units/mL Inject under the skin 2 (two) times a day before meals      losartan (COZAAR) 25 mg tablet Take 50 mg by mouth daily       !! methimazole (TAPAZOLE) 10 mg tablet Take 10 mg by mouth 3 (three) times a day      !! methimazole (TAPAZOLE) 5 mg tablet Take 5 mg by mouth 3 (three) times a day      metoclopramide (REGLAN) 5 mg tablet Take 5 mg by mouth 4 (four) times a day      omeprazole (PriLOSEC) 20 mg delayed release capsule Take 20 mg by mouth daily      sucralfate (CARAFATE) 1 g tablet Take 1 g by mouth 4 (four) times a day      venlafaxine (EFFEXOR-XR) 75 mg 24 hr capsule Take 75 mg by mouth daily      zolpidem (AMBIEN) 5 mg tablet Take 5 mg by mouth daily at bedtime      guaiFENesin (MUCINEX) 600 mg 12 hr tablet Take 1,200 mg by mouth every 12 (twelve) hours      levETIRAcetam (KEPPRA) 500 mg tablet Take 1 tablet by mouth every 12 (twelve) hours for 7 days  Qty: 14 tablet, Refills: 0      sodium chloride (OCEAN) 0 65 % nasal spray 1 spray into each nostril as needed for congestion       !! - Potential duplicate medications found  Please discuss with provider  No discharge procedures on file      PDMP Review     None          ED Provider  Electronically Signed by           Berna Lizama PA-C  03/20/21 0834

## 2021-03-21 LAB
ANION GAP SERPL CALCULATED.3IONS-SCNC: 11 MMOL/L (ref 4–13)
ANION GAP SERPL CALCULATED.3IONS-SCNC: 7 MMOL/L (ref 4–13)
BUN SERPL-MCNC: 20 MG/DL (ref 5–25)
BUN SERPL-MCNC: 28 MG/DL (ref 5–25)
CALCIUM SERPL-MCNC: 9 MG/DL (ref 8.3–10.1)
CALCIUM SERPL-MCNC: 9 MG/DL (ref 8.3–10.1)
CHLORIDE SERPL-SCNC: 108 MMOL/L (ref 100–108)
CHLORIDE SERPL-SCNC: 108 MMOL/L (ref 100–108)
CO2 SERPL-SCNC: 23 MMOL/L (ref 21–32)
CO2 SERPL-SCNC: 25 MMOL/L (ref 21–32)
CREAT SERPL-MCNC: 1.14 MG/DL (ref 0.6–1.3)
CREAT SERPL-MCNC: 1.41 MG/DL (ref 0.6–1.3)
ERYTHROCYTE [DISTWIDTH] IN BLOOD BY AUTOMATED COUNT: 12 % (ref 11.6–15.1)
GFR SERPL CREATININE-BSD FRML MDRD: 43 ML/MIN/1.73SQ M
GFR SERPL CREATININE-BSD FRML MDRD: 55 ML/MIN/1.73SQ M
GLUCOSE SERPL-MCNC: 105 MG/DL (ref 65–140)
GLUCOSE SERPL-MCNC: 133 MG/DL (ref 65–140)
GLUCOSE SERPL-MCNC: 135 MG/DL (ref 65–140)
GLUCOSE SERPL-MCNC: 210 MG/DL (ref 65–140)
GLUCOSE SERPL-MCNC: 250 MG/DL (ref 65–140)
GLUCOSE SERPL-MCNC: 252 MG/DL (ref 65–140)
GLUCOSE SERPL-MCNC: 33 MG/DL (ref 65–140)
GLUCOSE SERPL-MCNC: 331 MG/DL (ref 65–140)
GLUCOSE SERPL-MCNC: 46 MG/DL (ref 65–140)
GLUCOSE SERPL-MCNC: 56 MG/DL (ref 65–140)
GLUCOSE SERPL-MCNC: 63 MG/DL (ref 65–140)
GLUCOSE SERPL-MCNC: 76 MG/DL (ref 65–140)
GLUCOSE SERPL-MCNC: <20 MG/DL (ref 65–140)
HCT VFR BLD AUTO: 39.1 % (ref 34.8–46.1)
HGB BLD-MCNC: 12.7 G/DL (ref 11.5–15.4)
MCH RBC QN AUTO: 29.9 PG (ref 26.8–34.3)
MCHC RBC AUTO-ENTMCNC: 32.5 G/DL (ref 31.4–37.4)
MCV RBC AUTO: 92 FL (ref 82–98)
PLATELET # BLD AUTO: 296 THOUSANDS/UL (ref 149–390)
PMV BLD AUTO: 10.3 FL (ref 8.9–12.7)
POTASSIUM SERPL-SCNC: 3.5 MMOL/L (ref 3.5–5.3)
POTASSIUM SERPL-SCNC: 5.3 MMOL/L (ref 3.5–5.3)
RBC # BLD AUTO: 4.25 MILLION/UL (ref 3.81–5.12)
SODIUM SERPL-SCNC: 140 MMOL/L (ref 136–145)
SODIUM SERPL-SCNC: 142 MMOL/L (ref 136–145)
TROPONIN I SERPL-MCNC: 0.02 NG/ML
TROPONIN I SERPL-MCNC: 0.03 NG/ML
WBC # BLD AUTO: 8.32 THOUSAND/UL (ref 4.31–10.16)

## 2021-03-21 PROCEDURE — 84484 ASSAY OF TROPONIN QUANT: CPT | Performed by: INTERNAL MEDICINE

## 2021-03-21 PROCEDURE — 82948 REAGENT STRIP/BLOOD GLUCOSE: CPT

## 2021-03-21 PROCEDURE — 80048 BASIC METABOLIC PNL TOTAL CA: CPT | Performed by: INTERNAL MEDICINE

## 2021-03-21 PROCEDURE — 99232 SBSQ HOSP IP/OBS MODERATE 35: CPT | Performed by: INTERNAL MEDICINE

## 2021-03-21 PROCEDURE — 85027 COMPLETE CBC AUTOMATED: CPT | Performed by: INTERNAL MEDICINE

## 2021-03-21 RX ORDER — INSULIN GLARGINE 100 [IU]/ML
10 INJECTION, SOLUTION SUBCUTANEOUS
Status: DISCONTINUED | OUTPATIENT
Start: 2021-03-21 | End: 2021-03-22

## 2021-03-21 RX ORDER — DEXTROSE MONOHYDRATE 25 G/50ML
INJECTION, SOLUTION INTRAVENOUS
Status: COMPLETED
Start: 2021-03-21 | End: 2021-03-21

## 2021-03-21 RX ORDER — DEXTROSE MONOHYDRATE 25 G/50ML
25 INJECTION, SOLUTION INTRAVENOUS ONCE
Status: DISCONTINUED | OUTPATIENT
Start: 2021-03-21 | End: 2021-03-21

## 2021-03-21 RX ORDER — METOCLOPRAMIDE 10 MG/1
5 TABLET ORAL 4 TIMES DAILY
Status: DISCONTINUED | OUTPATIENT
Start: 2021-03-21 | End: 2021-03-24 | Stop reason: HOSPADM

## 2021-03-21 RX ORDER — INSULIN GLARGINE 100 [IU]/ML
15 INJECTION, SOLUTION SUBCUTANEOUS
Status: DISCONTINUED | OUTPATIENT
Start: 2021-03-21 | End: 2021-03-21

## 2021-03-21 RX ORDER — AMLODIPINE BESYLATE 5 MG/1
5 TABLET ORAL DAILY
COMMUNITY

## 2021-03-21 RX ORDER — METHOCARBAMOL 500 MG/1
500 TABLET, FILM COATED ORAL 4 TIMES DAILY PRN
COMMUNITY

## 2021-03-21 RX ORDER — DEXTROSE MONOHYDRATE 25 G/50ML
25 INJECTION, SOLUTION INTRAVENOUS ONCE
Status: COMPLETED | OUTPATIENT
Start: 2021-03-21 | End: 2021-03-21

## 2021-03-21 RX ORDER — ZOLPIDEM TARTRATE 5 MG/1
5 TABLET ORAL
Status: DISCONTINUED | OUTPATIENT
Start: 2021-03-21 | End: 2021-03-24 | Stop reason: HOSPADM

## 2021-03-21 RX ORDER — ACETAMINOPHEN 325 MG/1
650 TABLET ORAL EVERY 6 HOURS PRN
Status: DISCONTINUED | OUTPATIENT
Start: 2021-03-21 | End: 2021-03-24 | Stop reason: HOSPADM

## 2021-03-21 RX ORDER — INSULIN GLARGINE 100 [IU]/ML
25 INJECTION, SOLUTION SUBCUTANEOUS
Status: DISCONTINUED | OUTPATIENT
Start: 2021-03-21 | End: 2021-03-21

## 2021-03-21 RX ADMIN — AMLODIPINE BESYLATE 5 MG: 5 TABLET ORAL at 09:11

## 2021-03-21 RX ADMIN — DEXTROSE MONOHYDRATE 25 ML: 25 INJECTION, SOLUTION INTRAVENOUS at 01:26

## 2021-03-21 RX ADMIN — INSULIN LISPRO 2 UNITS: 100 INJECTION, SOLUTION INTRAVENOUS; SUBCUTANEOUS at 17:20

## 2021-03-21 RX ADMIN — METOCLOPRAMIDE 5 MG: 10 TABLET ORAL at 17:19

## 2021-03-21 RX ADMIN — INSULIN GLARGINE 10 UNITS: 100 INJECTION, SOLUTION SUBCUTANEOUS at 21:37

## 2021-03-21 RX ADMIN — ATORVASTATIN CALCIUM 80 MG: 80 TABLET, FILM COATED ORAL at 17:18

## 2021-03-21 RX ADMIN — METOCLOPRAMIDE 5 MG: 10 TABLET ORAL at 09:12

## 2021-03-21 RX ADMIN — PANTOPRAZOLE SODIUM 20 MG: 20 TABLET, DELAYED RELEASE ORAL at 08:04

## 2021-03-21 RX ADMIN — METOCLOPRAMIDE 5 MG: 10 TABLET ORAL at 21:37

## 2021-03-21 RX ADMIN — ASPIRIN 81 MG: 81 TABLET, COATED ORAL at 09:10

## 2021-03-21 RX ADMIN — INSULIN GLARGINE 25 UNITS: 100 INJECTION, SOLUTION SUBCUTANEOUS at 01:38

## 2021-03-21 RX ADMIN — SODIUM CHLORIDE 100 ML/HR: 0.9 INJECTION, SOLUTION INTRAVENOUS at 07:29

## 2021-03-21 RX ADMIN — VENLAFAXINE HYDROCHLORIDE 75 MG: 75 CAPSULE, EXTENDED RELEASE ORAL at 09:12

## 2021-03-21 RX ADMIN — METHIMAZOLE 10 MG: 5 TABLET ORAL at 21:37

## 2021-03-21 RX ADMIN — ATENOLOL 25 MG: 25 TABLET ORAL at 09:10

## 2021-03-21 RX ADMIN — CLOPIDOGREL BISULFATE 75 MG: 75 TABLET ORAL at 09:10

## 2021-03-21 RX ADMIN — METOCLOPRAMIDE 5 MG: 10 TABLET ORAL at 12:35

## 2021-03-21 RX ADMIN — SODIUM CHLORIDE 75 ML/HR: 0.9 INJECTION, SOLUTION INTRAVENOUS at 19:36

## 2021-03-21 RX ADMIN — METHIMAZOLE 10 MG: 5 TABLET ORAL at 09:13

## 2021-03-21 RX ADMIN — Medication 16 G: at 21:01

## 2021-03-21 RX ADMIN — INSULIN LISPRO 3 UNITS: 100 INJECTION, SOLUTION INTRAVENOUS; SUBCUTANEOUS at 11:39

## 2021-03-21 NOTE — PLAN OF CARE
Problem: Potential for Falls  Goal: Patient will remain free of falls  Description: INTERVENTIONS:  - Assess patient frequently for physical needs  -  Identify cognitive and physical deficits and behaviors that affect risk of falls  -  [place] fall precautions as indicated by assessment   - Educate patient/family on patient safety including physical limitations  - Instruct patient to call for assistance with activity based on assessment  - Modify environment to reduce risk of injury  - Consider OT/PT consult to assist with strengthening/mobility  Outcome: Progressing     Problem: PAIN - ADULT  Goal: Verbalizes/displays adequate comfort level or baseline comfort level  Description: Interventions:  - Encourage patient to monitor pain and request assistance  - Assess pain using appropriate pain scale  - Administer analgesics based on type and severity of pain and evaluate response  - Implement non-pharmacological measures as appropriate and evaluate response  - Consider cultural and social influences on pain and pain management  - Notify physician/advanced practitioner if interventions unsuccessful or patient reports new pain  Outcome: Progressing     Problem: SAFETY ADULT  Goal: Patient will remain free of falls  Description: INTERVENTIONS:  - Assess patient frequently for physical needs  -  Identify cognitive and physical deficits and behaviors that affect risk of falls    -  [place] fall precautions as indicated by assessment   - Educate patient/family on patient safety including physical limitations  - Instruct patient to call for assistance with activity based on assessment  - Modify environment to reduce risk of injury  - Consider OT/PT consult to assist with strengthening/mobility  Outcome: Progressing  Goal: Maintain or return to baseline ADL function  Description: INTERVENTIONS:  -  Assess patient's ability to carry out ADLs; assess patient's baseline for ADL function and identify physical deficits which impact ability to perform ADLs (bathing, care of mouth/teeth, toileting, grooming, dressing, etc )  - Assess/evaluate cause of self-care deficits   - Assess range of motion  - Assess patient's mobility; develop plan if impaired  - Assess patient's need for assistive devices and provide as appropriate  - Encourage maximum independence but intervene and supervise when necessary  - Involve family in performance of ADLs  - Assess for home care needs following discharge   - Consider OT consult to assist with ADL evaluation and planning for discharge  - Provide patient education as appropriate  Outcome: Progressing  Goal: Maintain or return mobility status to optimal level  Description: INTERVENTIONS:  - Assess patient's baseline mobility status (ambulation, transfers, stairs, etc )    - Identify cognitive and physical deficits and behaviors that affect mobility  - Identify mobility aids required to assist with transfers and/or ambulation (gait belt, sit-to-stand, lift, walker, cane, etc )  - [place] fall precautions as indicated by assessment  - Record patient progress and toleration of activity level on Mobility SBAR; progress patient to next Phase/Stage  - Instruct patient to call for assistance with activity based on assessment  - Consider rehabilitation consult to assist with strengthening/weightbearing, etc   Outcome: Progressing     Problem: DISCHARGE PLANNING  Goal: Discharge to home or other facility with appropriate resources  Description: INTERVENTIONS:  - Identify barriers to discharge w/patient and caregiver  - Arrange for needed discharge resources and transportation as appropriate  - Identify discharge learning needs (meds, wound care, etc )  - Arrange for interpretive services to assist at discharge as needed  - Refer to Case Management Department for coordinating discharge planning if the patient needs post-hospital services based on physician/advanced practitioner order or complex needs related to functional status, cognitive ability, or social support system  Outcome: Progressing     Problem: Knowledge Deficit  Goal: Patient/family/caregiver demonstrates understanding of disease process, treatment plan, medications, and discharge instructions  Description: Complete learning assessment and assess knowledge base    Interventions:  - Provide teaching at level of understanding  - Provide teaching via preferred learning methods  Outcome: Progressing     Problem: METABOLIC, FLUID AND ELECTROLYTES - ADULT  Goal: Glucose maintained within target range  Description: INTERVENTIONS:  - Monitor Blood Glucose as ordered  - Assess for signs and symptoms of hyperglycemia and hypoglycemia  - Administer ordered medications to maintain glucose within target range  - Assess nutritional intake and initiate nutrition service referral as needed  Outcome: Progressing

## 2021-03-21 NOTE — ASSESSMENT & PLAN NOTE
Lab Results   Component Value Date    EGFR 33 03/20/2021    EGFR 43 03/14/2021    EGFR 54 05/26/2019    CREATININE 1 74 (H) 03/20/2021    CREATININE 1 39 (H) 03/14/2021    CREATININE 1 17 05/26/2019   History of chronic kidney disease, monitor renal function

## 2021-03-21 NOTE — ASSESSMENT & PLAN NOTE
History of coronary artery disease, continue Plavix, statin, beta-blocker  History of right coronary artery stent placement 2017  Most recent ejection fraction noted in the medical record at 70 percent

## 2021-03-21 NOTE — ASSESSMENT & PLAN NOTE
Lab Results   Component Value Date    HGBA1C 11 2 (H) 01/13/2021       Recent Labs     03/20/21 1941   POCGLU >500*       Blood Sugar Average: Last 72 hrs:   history of type 1 diabetes followed by [facility] endocrinology  Will need insulin upon discharge, please initiate mealtime insulin once patient is tolerating oral diet

## 2021-03-21 NOTE — ASSESSMENT & PLAN NOTE
Followed by endocrinology  Currently on Tapazole 10 milligrams twice a day for total dose of 20 milligram

## 2021-03-21 NOTE — ASSESSMENT & PLAN NOTE
1  Acute renal failure, Most likely pre-renal GEO 2/2 volume depletion  -cont IVF at 150cc/h for now; keep MAP > 65mmHg  - normal PLT, doubt HUS/TTP  - avoid renal toxins    Baseline creatinine 1 39 from 6 days ago

## 2021-03-21 NOTE — H&P
Han 50  History and physical   - Hue Montana 1965, 50 y o  female MRN: [id number]  Unit/Bed#: E2 -01 Encounter: [id number]  Primary Care Provider: Deepthi Taylor MD   Date and time admitted to hospital: 3/20/2021  7:38 PM    * Hyperosmolar hyperglycemic state (HHS) ([facility] )  Assessment & Plan  No evidence of diabetic ketoacidosis  No gap  Start insulin drip  Check BMP every 4 hours    Complete heart block (HCC)  Assessment & Plan  History of complete heart block, she is status post pacemaker placement    Subconjunctival hemorrhage  Assessment & Plan  History of retinopathy medical blindness from type 1 diabetes  She did have a fall and subsequently right eye subconjunctival hemorrhage  Reports no eye pain or vision loss    Type I diabetes mellitus ([facility] )  Assessment & Plan  Lab Results   Component Value Date    HGBA1C 11 2 (H) 01/13/2021       Recent Labs     03/20/21 1941   POCGLU >500*       Blood Sugar Average: Last 72 hrs:   history of type 1 diabetes followed by [facility] endocrinology  Will need insulin upon discharge, please initiate mealtime insulin once patient is tolerating oral diet    GEO (acute kidney injury) ([facility] )  Assessment & Plan  1   Acute renal failure, Most likely pre-renal GEO 2/2 volume depletion  -cont IVF at 150cc/h for now; keep MAP > 65mmHg  - normal PLT, doubt HUS/TTP  - avoid renal toxins    Baseline creatinine 1 39 from 6 days ago    CKD (chronic kidney disease)  Assessment & Plan  Lab Results   Component Value Date    EGFR 33 03/20/2021    EGFR 43 03/14/2021    EGFR 54 05/26/2019    CREATININE 1 74 (H) 03/20/2021    CREATININE 1 39 (H) 03/14/2021    CREATININE 1 17 05/26/2019   History of chronic kidney disease, monitor renal function     Hypertension  Assessment & Plan  Continue home medications with hold parameters    Coronary artery disease  Assessment & Plan  History of coronary artery disease, continue Plavix, statin, beta-blocker  History of right coronary artery stent placement 2017  Most recent ejection fraction noted in the medical record at 70 percent    Hyponatremia  Assessment & Plan  Pseudohyponatremia in the setting of hyperglycemia, corrects to 139   Blood glucose of 946 present on admission  Recent Labs     03/20/21  1947   SODIUM 125*         Graves disease  Assessment & Plan  Followed by endocrinology  Currently on Tapazole 10 milligrams twice a day for total dose of 20 milligram    VTE Prophylaxis: Heparin  / sequential compression device   Code Status:  Full code  POLST: There is no POLST form on file for this patient (pre-hospital)  Discussion with family: Robert Sanchez at 9:42 pm  Patient's Guardian, no VM left    Anticipated Length of Stay:  Patient will be admitted on an Inpatient basis with an anticipated length of stay of  greater 2 midnights  Justification for Hospital Stay: hyperglycemia, [facility]    Total Time for Visit, including Counseling / Coordination of Care: 45 minutes  Greater than 50% of this total time spent on direct patient counseling and coordination of care  Chief Complaint:   Hyperglycemia    History of Present Illness:    Faustino Jean is a 50 y o  female who presents with hyperglycemia from her rehab facility  The patient has history of cognitive impairment, she has multiple comorbidities including coronary disease status post drug-eluting stent to the right coronary artery in 2017, complete heart block status post pacemaker placement diabetes mellitus complicated by blindness, hyperthyroidism, chronic kidney disease  She [place] [facility] [place]  She reports she had a fall although does not recall when  She denies any nausea or vomiting, no chest pain  In the emergency room she was found have significant hyperglycemia  She denies any other acute complaints  No fevers or chills, no recent travel or trip history      Review of Systems:    A complete and comprehensive 14 point organ system review was performed and all other systems are negative other than stated above in the HPI    Past Medical and Surgical History:     Past Medical History:   Diagnosis Date    Diabetes mellitus ([facility] )     Hypertension     Seizures ([facility] )     Type 1 diabetes ([facility] )        History reviewed  No pertinent surgical history  Meds/Allergies:    Prior to Admission medications    Medication Sig Start Date End Date Taking?  Authorizing Provider   acetaminophen (TYLENOL) 325 mg tablet Take 650 mg by mouth every 4 (four) hours as needed for mild pain   Yes Historical Provider, MD   AMLODIPINE BESYLATE PO Take 5 mg by mouth   Yes Historical Provider, MD   aspirin (ECOTRIN LOW STRENGTH) 81 mg EC tablet Take 81 mg by mouth daily   Yes Historical Provider, MD   atenolol (TENORMIN) 25 mg tablet Take 25 mg by mouth daily   Yes Historical Provider, MD   atorvastatin (LIPITOR) 80 mg tablet Take 80 mg by mouth daily   Yes Historical Provider, MD   calcium carbonate (TUMS) 500 mg chewable tablet Chew 1 tablet daily   Yes Historical Provider, MD   clopidogrel (PLAVIX) 75 mg tablet Take 75 mg by mouth daily   Yes Historical Provider, MD   glucose 40 % Take 15 g by mouth once   Yes Historical Provider, MD   insulin glargine (LANTUS) 100 units/mL subcutaneous injection Inject under the skin daily at bedtime   Yes Historical Provider, MD   insulin lispro protamine-insulin lispro (HumaLOG 50-50) 100 units/mL Inject under the skin 2 (two) times a day before meals   Yes Historical Provider, MD   losartan (COZAAR) 25 mg tablet Take 50 mg by mouth daily    Yes Historical Provider, MD   methimazole (TAPAZOLE) 10 mg tablet Take 10 mg by mouth 3 (three) times a day   Yes Historical Provider, MD   methimazole (TAPAZOLE) 5 mg tablet Take 5 mg by mouth 3 (three) times a day   Yes Historical Provider, MD   metoclopramide (REGLAN) 5 mg tablet Take 5 mg by mouth 4 (four) times a day   Yes Historical Provider, MD omeprazole (PriLOSEC) 20 mg delayed release capsule Take 20 mg by mouth daily   Yes Historical Provider, MD   sucralfate (CARAFATE) 1 g tablet Take 1 g by mouth 4 (four) times a day   Yes Historical Provider, MD   venlafaxine (EFFEXOR-XR) 75 mg 24 hr capsule Take 75 mg by mouth daily   Yes Historical Provider, MD   zolpidem (AMBIEN) 5 mg tablet Take 5 mg by mouth daily at bedtime   Yes Historical Provider, MD   guaiFENesin (MUCINEX) 600 mg 12 hr tablet Take 1,200 mg by mouth every 12 (twelve) hours    Historical Provider, MD   levETIRAcetam (KEPPRA) 500 mg tablet Take 1 tablet by mouth every 12 (twelve) hours for 7 days 7/25/17 8/1/17  Oral Hummer, DO   sodium chloride (OCEAN) 0 65 % nasal spray 1 spray into each nostril as needed for congestion    Historical Provider, MD     I have reviewed home medications with patient personally  Allergies: Allergies   Allergen Reactions    Penicillins        Social History:     Marital Status: Single   Occupation:  Disabled    Substance Use History:   Social History     Substance and Sexual Activity   Alcohol Use No     Social History     Tobacco Use   Smoking Status Former Smoker    Types: Cigarettes   Smokeless Tobacco Never Used     Social History     Substance and Sexual Activity   Drug Use No       Family History:    History reviewed  No pertinent family history    Reviewed noncontributory  Physical Exam:     Vitals:   Blood Pressure: 135/65 (03/20/21 2129)  Pulse: 67 (03/20/21 2129)  Temperature: 98 2 °F (36 8 °C) (03/20/21 1939)  Temp Source: Oral (03/20/21 1939)  Respirations: 16 (03/20/21 2129)  Weight - Scale: 57 6 kg (126 lb 15 8 oz) (03/20/21 1939)  SpO2: 100 % (03/20/21 2129)      General:  Appears chronically ill  HEENT:  Left eye redness  Neck: Trachea midline, no carotid bruit, no masses  Respiratory: normal chest wall expansion, CTA B, no r/r/w, no rubs  Cardiovascular: RRR, no m/r/g, Normal S1 and S2  Abdomen: Soft, non-tender, non-distended, normal bowel sounds in all quadrants, no hepatosplenomegaly, no tympany  Rectal: deferred  Musculoskeletal: normal ROM in upper and lower extremities  Integumentary: warm, dry, and pink, with no rash, purpura, or petechia  Heme/Lymph: no lymphadenopathy, no bruises  Neurological: Cranial Nerves II-XII grossly intact  Psychiatric:  Odd affect    Additional Data:     Lab Results: I have personally reviewed pertinent reports  Results from last 7 days   Lab Units 03/20/21 1947 03/14/21  1736   WBC Thousand/uL 6 32 6 28   HEMOGLOBIN g/dL 12 3 13 1   HEMATOCRIT % 37 0 39 5   PLATELETS Thousands/uL 270 262   NEUTROS PCT %  --  72   LYMPHS PCT %  --  17   MONOS PCT %  --  10   EOS PCT %  --  1     Results from last 7 days   Lab Units 03/20/21 1947   SODIUM mmol/L 125*   POTASSIUM mmol/L 5 6*   CHLORIDE mmol/L 91*   CO2 mmol/L 24   BUN mg/dL 31*   CREATININE mg/dL 1 74*   ANION GAP mmol/L 10   CALCIUM mg/dL 9 4   ALBUMIN g/dL 3 6   TOTAL BILIRUBIN mg/dL 0 39   ALK PHOS U/L 128*   ALT U/L 26   AST U/L 10   GLUCOSE RANDOM mg/dL 946*         Results from last 7 days   Lab Units 03/20/21 1941   POC GLUCOSE mg/dl >500*               Imaging: I have personally reviewed pertinent reports  CT head without contrast   Final Result by General Radha DO (03/20 2041)      No acute intracranial abnormality  Workstation performed: [id number]             EKG, Pathology, and Other Studies Reviewed on Admission:   · Reviewed all previous medical documentation including head CT    [facility] Reviewed: Yes     Total critical care time 60 minutes  Total critical care time documented does not include time spent on separately billed procedures or the services of  nurse practioners or physician assistants  I have personally seen and examined the patient  I have reviewed all diagnostic interpretations and treatment plans as written   I was present for the key portions of any procedures performed and the inclusive time noted in any critical care statement  Critical care time includes patient management by me, time spent at the patients bedside, time to review lab and imaging results, discussing patient care, documentation in the medical record, and time spent with the family or caregiver  Meets Critical care criteria based on  Organ System affected endocrine  Time Spent 60 minutes  Action taken is insulin drip for life-threatening hyper glycemia, without this the patient has a life threatening condition for which rapid deterioation is imminent and will lead to potential death if untreated          ** Please Note: This note was completed in part utilizing [employer] Direct Software  Grammatical errors, random word insertions, spelling mistakes, and incomplete sentences may be an occasional consequence of this system secondary to software limitations, ambient noise, and hardware issues  If you have any questions or concerns about the content, text, or information contained within the body of this dictation, please contact the provider for clarification  **

## 2021-03-21 NOTE — PROGRESS NOTES
Progress Note - Jimmie April 50 y o  female MRN: [id number]    Unit/Bed#: E2 -01 Encounter: [id number]    Assessment/Plan:    Hyper osmolar hyperglycemic state  patient reported drinking sodas yesterday resulting in hyperglycemia, status post insulin infusion now trending hypoglycemia, continue sliding scale with subQ insulin with ADA diet    History of heart block    S/P pacemaker    Right conjunctival hemorrhage  history of fall monitor    Diabetes     poor control, poor dietary choices continue insulin with ADA diet and sliding scale    A KI/CKD 3     appears prerenal continue IV fluids and monitor, baseline creatinine appears to be 1 2    Subjective:   Feels better today, denies chest pain shortness of breath no nausea vomiting no fevers chills appetite good    Objective:     Vitals: Blood pressure 115/61, pulse 61, temperature 98 2 °F (36 8 °C), temperature source Temporal, resp  rate 16, height 5' 1" (1 549 m), weight 57 6 kg (126 lb 15 8 oz), SpO2 99 %  ,Body mass index is 23 99 kg/m²        Results from last 7 days   Lab Units 03/21/21  0535   WBC Thousand/uL 8 32   HEMOGLOBIN g/dL 12 7   HEMATOCRIT % 39 1   PLATELETS Thousands/uL 296     Results from last 7 days   Lab Units 03/21/21  0042 03/20/21  1947   POTASSIUM mmol/L 3 5 5 6*   CHLORIDE mmol/L 108 91*   CO2 mmol/L 23 24   BUN mg/dL 28* 31*   CREATININE mg/dL 1 41* 1 74*   CALCIUM mg/dL 9 0 9 4   ALK PHOS U/L  --  128*   ALT U/L  --  26   AST U/L  --  10       Scheduled Meds:  Current Facility-Administered Medications   Medication Dose Route Frequency Provider Last Rate    acetaminophen  650 mg Oral Q6H PRN WING Flores      amLODIPine  5 mg Oral Daily Galo Carlin, DO      aspirin  81 mg Oral Daily Galo Carlin, DO      atenolol  25 mg Oral Daily Galo Carlin, DO      atorvastatin  80 mg Oral QPM Galo Stock, DO      clopidogrel  75 mg Oral Daily Galo Carlin, DO      insulin glargine  25 Units Subcutaneous [facility] WING Vaughan      insulin lispro  1-6 Units Subcutaneous 4x Daily (AC & HS) WING Kinney      methimazole  10 mg Oral Q12H [facility] Galo Mattson, DO      metoclopramide  5 mg Oral 4x Daily WING Kinney      ondansetron  4 mg Intravenous Once PRN Sharath Herzog PA-C      pantoprazole  20 mg Oral Early Morning Galo DEE Suellyn Jerry, DO      sodium chloride  100 mL/hr Intravenous Continuous WING Kinney 100 mL/hr (03/21/21 0729)    venlafaxine  75 mg Oral Daily Galo D Sumario Jeryr, DO      zolpidem  5 mg Oral HS PRN WING Kinney         Continuous Infusions:  sodium chloride, 100 mL/hr, Last Rate: 100 mL/hr (03/21/21 0729)          Physical exam:  General appearance:  Alert no distress interaction appropriate  Head/Eyes:  Right conjunctival injection  Neck:  Supple  Lungs:  Decreased BS bilateral no wheezing rhonchi or rales  Heart: normal S1 S2 regular  Abdomen: Soft nontender with bowel sounds  Extremities: no edema  Skin: no rash    Invasive Devices     Peripheral Intravenous Line            Peripheral IV 03/20/21 Left Forearm less than 1 day                  Counseling / Coordination of Care  Total floor / unit time spent today 30  minutes  Greater than 50% of total time was spent with the patient and / or family counseling and / or coordination of care    A description of the counseling / coordination of care:

## 2021-03-21 NOTE — ASSESSMENT & PLAN NOTE
History of retinopathy medical blindness from type 1 diabetes  She did have a fall and subsequently right eye subconjunctival hemorrhage  Reports no eye pain or vision loss

## 2021-03-22 PROBLEM — N18.30 STAGE 3 CHRONIC KIDNEY DISEASE (HCC): Status: ACTIVE | Noted: 2021-03-20

## 2021-03-22 PROBLEM — E87.1 HYPONATREMIA: Status: RESOLVED | Noted: 2021-03-20 | Resolved: 2021-03-22

## 2021-03-22 PROBLEM — E11.00 HYPEROSMOLAR HYPERGLYCEMIC STATE (HHS) (HCC): Status: RESOLVED | Noted: 2021-03-20 | Resolved: 2021-03-22

## 2021-03-22 PROBLEM — E11.65 HYPEROSMOLAR HYPERGLYCEMIC STATE (HHS) (HCC): Status: RESOLVED | Noted: 2021-03-20 | Resolved: 2021-03-22

## 2021-03-22 PROBLEM — Z95.0 PACEMAKER: Status: ACTIVE | Noted: 2021-03-20

## 2021-03-22 LAB
ANION GAP SERPL CALCULATED.3IONS-SCNC: 6 MMOL/L (ref 4–13)
BASOPHILS # BLD AUTO: 0.05 THOUSANDS/ΜL (ref 0–0.1)
BASOPHILS NFR BLD AUTO: 1 % (ref 0–1)
BUN SERPL-MCNC: 15 MG/DL (ref 5–25)
CALCIUM SERPL-MCNC: 8.8 MG/DL (ref 8.3–10.1)
CHLORIDE SERPL-SCNC: 112 MMOL/L (ref 100–108)
CO2 SERPL-SCNC: 27 MMOL/L (ref 21–32)
CREAT SERPL-MCNC: 1.1 MG/DL (ref 0.6–1.3)
EOSINOPHIL # BLD AUTO: 0.06 THOUSAND/ΜL (ref 0–0.61)
EOSINOPHIL NFR BLD AUTO: 1 % (ref 0–6)
ERYTHROCYTE [DISTWIDTH] IN BLOOD BY AUTOMATED COUNT: 12.7 % (ref 11.6–15.1)
GFR SERPL CREATININE-BSD FRML MDRD: 57 ML/MIN/1.73SQ M
GLUCOSE SERPL-MCNC: 118 MG/DL (ref 65–140)
GLUCOSE SERPL-MCNC: 135 MG/DL (ref 65–140)
GLUCOSE SERPL-MCNC: 139 MG/DL (ref 65–140)
GLUCOSE SERPL-MCNC: 139 MG/DL (ref 65–140)
GLUCOSE SERPL-MCNC: 149 MG/DL (ref 65–140)
GLUCOSE SERPL-MCNC: 214 MG/DL (ref 65–140)
GLUCOSE SERPL-MCNC: 339 MG/DL (ref 65–140)
GLUCOSE SERPL-MCNC: 356 MG/DL (ref 65–140)
GLUCOSE SERPL-MCNC: 363 MG/DL (ref 65–140)
HCT VFR BLD AUTO: 40 % (ref 34.8–46.1)
HGB BLD-MCNC: 12.9 G/DL (ref 11.5–15.4)
IMM GRANULOCYTES # BLD AUTO: 0.03 THOUSAND/UL (ref 0–0.2)
IMM GRANULOCYTES NFR BLD AUTO: 0 % (ref 0–2)
LYMPHOCYTES # BLD AUTO: 2.01 THOUSANDS/ΜL (ref 0.6–4.47)
LYMPHOCYTES NFR BLD AUTO: 26 % (ref 14–44)
MCH RBC QN AUTO: 30.2 PG (ref 26.8–34.3)
MCHC RBC AUTO-ENTMCNC: 32.3 G/DL (ref 31.4–37.4)
MCV RBC AUTO: 94 FL (ref 82–98)
MONOCYTES # BLD AUTO: 0.62 THOUSAND/ΜL (ref 0.17–1.22)
MONOCYTES NFR BLD AUTO: 8 % (ref 4–12)
NEUTROPHILS # BLD AUTO: 4.95 THOUSANDS/ΜL (ref 1.85–7.62)
NEUTS SEG NFR BLD AUTO: 64 % (ref 43–75)
NRBC BLD AUTO-RTO: 0 /100 WBCS
PLATELET # BLD AUTO: 295 THOUSANDS/UL (ref 149–390)
PMV BLD AUTO: 10 FL (ref 8.9–12.7)
POTASSIUM SERPL-SCNC: 4.6 MMOL/L (ref 3.5–5.3)
RBC # BLD AUTO: 4.27 MILLION/UL (ref 3.81–5.12)
SODIUM SERPL-SCNC: 145 MMOL/L (ref 136–145)
WBC # BLD AUTO: 7.72 THOUSAND/UL (ref 4.31–10.16)

## 2021-03-22 PROCEDURE — 99239 HOSP IP/OBS DSCHRG MGMT >30: CPT | Performed by: INTERNAL MEDICINE

## 2021-03-22 PROCEDURE — 97163 PT EVAL HIGH COMPLEX 45 MIN: CPT

## 2021-03-22 PROCEDURE — 85025 COMPLETE CBC W/AUTO DIFF WBC: CPT | Performed by: INTERNAL MEDICINE

## 2021-03-22 PROCEDURE — 82948 REAGENT STRIP/BLOOD GLUCOSE: CPT

## 2021-03-22 PROCEDURE — 97167 OT EVAL HIGH COMPLEX 60 MIN: CPT

## 2021-03-22 PROCEDURE — 80048 BASIC METABOLIC PNL TOTAL CA: CPT | Performed by: INTERNAL MEDICINE

## 2021-03-22 RX ORDER — INSULIN GLARGINE 100 [IU]/ML
25 INJECTION, SOLUTION SUBCUTANEOUS
Status: DISCONTINUED | OUTPATIENT
Start: 2021-03-22 | End: 2021-03-24 | Stop reason: HOSPADM

## 2021-03-22 RX ADMIN — METOCLOPRAMIDE 5 MG: 10 TABLET ORAL at 08:04

## 2021-03-22 RX ADMIN — ASPIRIN 81 MG: 81 TABLET, COATED ORAL at 08:06

## 2021-03-22 RX ADMIN — SODIUM CHLORIDE 75 ML/HR: 0.9 INJECTION, SOLUTION INTRAVENOUS at 08:09

## 2021-03-22 RX ADMIN — AMLODIPINE BESYLATE 5 MG: 5 TABLET ORAL at 08:06

## 2021-03-22 RX ADMIN — INSULIN LISPRO 5 UNITS: 100 INJECTION, SOLUTION INTRAVENOUS; SUBCUTANEOUS at 17:00

## 2021-03-22 RX ADMIN — METHIMAZOLE 10 MG: 5 TABLET ORAL at 21:51

## 2021-03-22 RX ADMIN — ATENOLOL 25 MG: 25 TABLET ORAL at 08:06

## 2021-03-22 RX ADMIN — METOCLOPRAMIDE 5 MG: 10 TABLET ORAL at 11:30

## 2021-03-22 RX ADMIN — METOCLOPRAMIDE 5 MG: 10 TABLET ORAL at 21:51

## 2021-03-22 RX ADMIN — INSULIN LISPRO 1 UNITS: 100 INJECTION, SOLUTION INTRAVENOUS; SUBCUTANEOUS at 16:57

## 2021-03-22 RX ADMIN — INSULIN LISPRO 10 UNITS: 100 INJECTION, SOLUTION INTRAVENOUS; SUBCUTANEOUS at 12:35

## 2021-03-22 RX ADMIN — INSULIN LISPRO 3 UNITS: 100 INJECTION, SOLUTION INTRAVENOUS; SUBCUTANEOUS at 10:52

## 2021-03-22 RX ADMIN — PANTOPRAZOLE SODIUM 20 MG: 20 TABLET, DELAYED RELEASE ORAL at 05:16

## 2021-03-22 RX ADMIN — INSULIN GLARGINE 25 UNITS: 100 INJECTION, SOLUTION SUBCUTANEOUS at 21:52

## 2021-03-22 RX ADMIN — CLOPIDOGREL BISULFATE 75 MG: 75 TABLET ORAL at 08:07

## 2021-03-22 RX ADMIN — METHIMAZOLE 10 MG: 5 TABLET ORAL at 08:04

## 2021-03-22 RX ADMIN — METOCLOPRAMIDE 5 MG: 10 TABLET ORAL at 17:16

## 2021-03-22 RX ADMIN — ATORVASTATIN CALCIUM 80 MG: 80 TABLET, FILM COATED ORAL at 17:16

## 2021-03-22 RX ADMIN — VENLAFAXINE HYDROCHLORIDE 75 MG: 75 CAPSULE, EXTENDED RELEASE ORAL at 08:04

## 2021-03-22 NOTE — UTILIZATION REVIEW
Initial Clinical Review    Admission: Date/Time/Statement:   Admission Orders (From admission, onward)     Ordered        03/20/21 2045  Inpatient Admission  Once                   Orders Placed This Encounter   Procedures    Inpatient Admission     Standing Status:   Standing     Number of Occurrences:   1     Order Specific Question:   Level of Care     Answer:   Med Surg [16]     Order Specific Question:   Estimated length of stay     Answer:   More than 2 Midnights     Order Specific Question:   Certification     Answer:   I certify that inpatient services are medically necessary for this patient for a duration of greater than two midnights  See H&P and MD Progress Notes for additional information about the patient's course of treatment  ED Arrival Information     Expected Arrival Acuity Means of Arrival Escorted By Service Admission Type    - 3/20/2021 19:38 Urgent [address] Hospitalist Urgent    Arrival Complaint    high blood sugar        Chief Complaint   Patient presents with    Hyperglycemia - Symptomatic     pt reports high blood sugar at the [facility] (600bs), pt reports dry mouht as only complaint     Assessment/Plan:  47 y/o female with PMhx of T1DM, h/o complete heart block s/p PPM, subconjunctival hemorrhage, SKD, CAD and Graves disease who presents to ED from her rehab facility with hyperglycemia  Pt BS at [facility] was >600 this AM  Pt reports she had a fall but does not recall when  In ED BS >500 with blood glucose 946  ABG done  No evidence of DKA, no gap  Cr 1 74  Admit inpatient to M/S/Tele unit -- start insulin gtt, titrate per protocol  BMP q4h  GEO likely 2/2 volume depletion  Avoid renal toxins  3/21 -- Pt reports feeling better  Hyper osmolar hyperglycemic state -- patient reported drinking sodas yesterday resulting in hyperglycemia, s/p insulin infusion now trending hypoglycemia, continue sliding scale with subQ insulin with ADA diet   Continue IVF's, monitor BMP  Cr 1 41        ED Triage Vitals   Temperature Pulse Respirations Blood Pressure SpO2   03/20/21 1939 03/20/21 1939 03/20/21 1939 03/20/21 1939 03/20/21 1939   98 2 °F (36 8 °C) 75 16 151/67 97 %      Temp Source Heart Rate Source Patient Position - Orthostatic VS BP Location FiO2 (%)   03/20/21 1939 03/20/21 2129 03/20/21 2129 03/20/21 2129 --   Oral Monitor Lying Right arm       Pain Score       03/20/21 2249       2          Wt Readings from Last 1 Encounters:   03/22/21 57 5 kg (126 lb 12 2 oz)     Additional Vital Signs:   Date/Time  Temp  Pulse  Resp  BP  SpO2  O2 Device   03/21/21 1630  97 6 °F (36 4 °C)  67  16  105/63  98 %  None (Room air)   03/21/21 0700  98 2 °F (36 8 °C)  61  16  115/61  99 %  None (Room air)   03/21/21 0214  --  62  --  103/56  --  --   03/21/21 0131  --  83  --  121/65  95 %  None (Room air)   03/21/21 0120  97 5 °F (36 4 °C)  98  17  167/79  --  --   03/20/21 2158  97 5 °F (36 4 °C)  75  16  145/64  100 %  None (Room air)   03/20/21 2129  --  67  16  135/65  100 %  None (Room air)   03/20/21 1939  98 2 °F (36 8 °C)  75  16  151/67  97 %  --       Pertinent Labs/Diagnostic Test Results:   CT head 3/20 -- No acute intracranial abnormality        Results from last 7 days   Lab Units 03/21/21  0535 03/20/21  1947   WBC Thousand/uL 8 32 6 32   HEMOGLOBIN g/dL 12 7 12 3   HEMATOCRIT % 39 1 37 0   PLATELETS Thousands/uL 296 270   NEUTROS ABS Thousands/µL  --   --      Results from last 7 days   Lab Units 03/21/21  1056 03/21/21  0042 03/20/21  1947   SODIUM mmol/L 140 142 125*   POTASSIUM mmol/L 5 3 3 5 5 6*   CHLORIDE mmol/L 108 108 91*   CO2 mmol/L 25 23 24   ANION GAP mmol/L 7 11 10   BUN mg/dL 20 28* 31*   CREATININE mg/dL 1 14 1 41* 1 74*   EGFR ml/min/1 73sq m 55 43 33   CALCIUM mg/dL 9 0 9 0 9 4     Results from last 7 days   Lab Units 03/20/21  1947   AST U/L 10   ALT U/L 26   ALK PHOS U/L 128*   TOTAL PROTEIN g/dL 7 2   ALBUMIN g/dL 3 6   TOTAL BILIRUBIN mg/dL 0 39 Results from last 7 days   Lab Units 03/21/21 2125 03/21/21  2052 03/21/21  1625 03/21/21  1158 03/21/21  1138 03/21/21  0704 03/21/21  0405 03/21/21  0209   POC GLUCOSE mg/dl 105 33* 210* 331* 252* 76 133 135     Results from last 7 days   Lab Units 03/21/21  1056 03/21/21  0042 03/20/21  1947   GLUCOSE RANDOM mg/dL 250* 56* 946*      Results from last 7 days   Lab Units 03/20/21  1947   PH STEPHANIE  7 426*   PCO2 STEPHANIE mm Hg 34 1*   PO2 STEPHANIE mm Hg 81 1*   HCO3 STEPHANIE mmol/L 21 9*   BASE EXC STEPHANIE mmol/L -1 8   O2 CONTENT STEPHANIE ml/dL 17 4   O2 HGB, VENOUS % 94 4*     Results from last 7 days   Lab Units 03/21/21  0421 03/21/21  0042 03/20/21 2128   TROPONIN I ng/mL 0 03 0 02 <0 02     Results from last 7 days   Lab Units 03/20/21  1947   TSH 3RD GENERATON uIU/mL 0 880     Results from last 7 days   Lab Units 03/20/21 2128   BLOOD CULTURE  No Growth at 24 hrs  No Growth at 24 hrs       ED Treatment:   Medication Administration from 03/20/2021 1938 to 03/20/2021 2140       Date/Time Order Dose Route Action     03/20/2021 1957 sodium chloride 0 9 % bolus 1,000 mL 1,000 mL Intravenous New Bag     03/20/2021 2039 sodium chloride 0 9 % bolus 1,000 mL 1,000 mL Intravenous New Bag     03/20/2021 2126 insulin regular (HumuLIN R,NovoLIN R) 1 Units/mL in sodium chloride 0 9 % 100 mL infusion 21 Units/hr Intravenous New Bag     Past Medical History:   Diagnosis Date    [facility]     Hypertension     Seizures ([facility] )     Type 1 diabetes ([facility] )      Present on Admission:   (Resolved) Hyperosmolar hyperglycemic state (HHS) ([facility] )   Graves disease   Coronary artery disease   Essential hypertension   Stage 3 chronic kidney disease   Type I diabetes mellitus ([facility] )   Subconjunctival hemorrhage   Pacemaker      Admitting Diagnosis: Hyperglycemia [R73 9]  HHNC (hyperglycemic hyperosmolar nonketotic coma) ([facility] ) [E11 01]  Hyperosmolar hyperglycemic state (HHS) ([facility] ) [E11 00, E11 65]  Age/Sex: 50 y o  female  Admission Orders:  Scheduled Medications:  amLODIPine, 5 mg, Oral, Daily  aspirin, 81 mg, Oral, Daily  atenolol, 25 mg, Oral, Daily  atorvastatin, 80 mg, Oral, QPM  clopidogrel, 75 mg, Oral, Daily  insulin glargine, 10 Units, Subcutaneous, HS  insulin lispro, 1-5 Units, Subcutaneous, TID AC  insulin lispro, 1-5 Units, Subcutaneous, HS  methimazole, 10 mg, Oral, Q12H RADHA  metoclopramide, 5 mg, Oral, 4x Daily  pantoprazole, 20 mg, Oral, Early Morning  venlafaxine, 75 mg, Oral, Daily    Continuous IV Infusions:  sodium chloride, 75 mL/hr, Intravenous, Continuous    insulin regular (HumuLIN R,NovoLIN R) 1 Units/mL in sodium chloride 0 9 % 100 mL infusion   Rate: 0 3-21 mL/hr Dose: 0 3-21 Units/hr  Freq: Titrated Route: IV  Start: 03/20/21 2100 End: 03/21/21 0117    PRN Meds:  acetaminophen, 650 mg, Oral, Q6H PRN  ondansetron, 4 mg, Intravenous, Once PRN  zolpidem, 5 mg, Oral, HS PRN        Network Utilization Review Department  ATTENTION: Please call with any questions or concerns to [phone] and carefully listen to the prompts so that you are directed to the right person  All voicemails are confidential   Osvaldoiece Crystal all requests for admission clinical reviews, approved or denied determinations and any other requests to dedicated fax number below belonging to the campus where the patient is receiving treatment   List of dedicated fax numbers for the Facilities:  [address] [place] [address] DENIALS (Administrative/Medical Necessity) [phone]   [address] (Maternity/NICU/Pediatrics) [phone]   [address] [address] [facility] Sita Matthews 1276 (Ravinder Peña "Yudy" 103) [address] [facility] [phone] 244 [facility] 301 [facility] [phone]   Ravinder Nicole 37 [address] [address] [phone]

## 2021-03-22 NOTE — ASSESSMENT & PLAN NOTE
Patient has a history of Graves disease  Followed by endocrinology as an outpatient  Currently on Tapazole 10 milligrams twice a day   Continue home regimen outpatient endocrinology follow-up

## 2021-03-22 NOTE — PHYSICAL THERAPY NOTE
PHYSICAL THERAPY EVALUATION          Patient Name: Patt Fuentes  EXPZZ'P Date: 3/22/2021   PT EVALUATION    50 y o     [id number]    Hyperglycemia [R73 9]  HHNC (hyperglycemic hyperosmolar nonketotic coma) ([facility] ) [E11 01]  Hyperosmolar hyperglycemic state (HHS) ([facility] ) [E11 00, E11 65]    Past Medical History:   Diagnosis Date    Diabetes mellitus ([facility] )     Hypertension     [facility]ures ([facility]     Type 1 diabetes ([facility] )      History reviewed  No pertinent surgical history  03/22/21 1430   PT Last Visit   PT Visit Date 03/22/21   Note Type   Note type Evaluation   Pain Assessment   Pain Assessment Tool Pain Assessment not indicated - pt denies pain   Pain Score No Pain   Home Living   Type of Home SNF  [facility])   Home Layout One level   Bathroom Shower/Tub Walk-in shower   Bathroom Equipment Grab bars in shower; Shower chair;Grab bars around toilet   Bathroom Accessibility Accessible via wheelchair   [address] [place] [address],[address]; Wheelchair-manual   Additional Comments resident at [facility]   Prior Function   Level of [address] with ADLs and functional mobility   Lives With Facility staff   ADL Assistance Independent   Falls in the last 6 months 1 to 4  (reports 1)   Comments pt reports being indep pta, does not use AD  does have access to wc which she utilizes to get to shower room or longer distances  denies recent therapy services   Restrictions/Precautions   Other Precautions Chair Alarm; Bed Alarm; Fall Risk;Visual impairment   General   Additional Pertinent History pt admitted 3/20/21 for hyperglycemia      Cognition   Overall Cognitive Status WFL   Arousal/Participation Cooperative   Attention Within functional limits   Orientation Level Oriented X4   Memory Within functional limits   Following Commands Follows all commands and directions without difficulty   RLE Assessment   RLE Assessment WFL  (4+ hip flexion, grossly 5/5)   LLE Assessment   LLE Assessment WFL  (4+ hip flexion, grossly 5/5)   Coordination   Sensation WFL   Bed Mobility   Supine to Sit 5  Supervision   Additional items Bedrails; Increased time required   Sit to Supine 5  Supervision   Additional items Bedrails; Increased time required   Transfers   Sit to Stand 5  Supervision   Additional items Verbal cues   Stand to Sit 5  Supervision   Additional items Bedrails;Verbal cues   Additional Comments S and cues for guidance d/t visual impairment   Ambulation/Elevation   Gait pattern Narrow RACIEL; Short stride; Excessively slow   Gait Assistance 4  Minimal assist   Additional items Assist x 1;Verbal cues  (HHA and verbal cues for guidance d/t vision)   Assistive Device Other (Comment)  (HHA on L)   Distance 45'   Balance   Static Standing Fair   Dynamic Standing Fair -   Ambulatory Fair -   Endurance Deficit   Endurance Deficit No   Activity Tolerance   Activity Tolerance Patient tolerated treatment well;Treatment limited secondary to medical complications (Comment)  (visual deficits)   Medical Staff Made Aware [facility] OT   Assessment   Prognosis Good   Problem List Decreased strength; Impaired balance; Impaired vision;Decreased skin integrity   Assessment Jeanie Connelly is a 50 y o  female admitted to [facility] on 3/20/2021 for Hyperosmolar hyperglycemic state (HHS) ([facility] )  PT was consulted and pt was seen on 3/22/2021 for mobility assessment and d/c planning  Pt presents high fall risk, visual impairment  Pt is currently functioning at a supervision assistance x1 level for bed mobility, supervision assistance x1 level for transfers, minimum assistance x1 level for ambulation with HHA  Pt require HHA and verbal guidance during ambulation given visual impairment  Able to see general colors/objects but had poor obstacle avoidance without assistance  Noted decreased gait speeds d/t same  Pt reports no difficulty ambulating at [facility] because she is familiar with the environment  Has access to a RW and wc if needed  Is interested in continuing PT upon d/c  Pt will benefit from continued skilled IP PT to address the above mentioned impairments  in order to maximize recovery and increase functional independence when completing mobility and ADLs  At this time PT recommendations for d/c are return to prev environment w HHPT  End of session pt repositioned in bed, alarm engaged and all needs in reach  Barriers to Discharge None   Goals   Patient Goals to go back to [facility] Expiration Date 04/01/21   Short Term Goal #1 1)  Pt will perform bed mobility with Tyrone demonstrating appropriate technique 100% of the time in order to improve function  2)  Perform all transfers with Tyrone demonstrating safe and appropriate technique 100% of the time in order to improve ability to negotiate safely in home environment  3) Amb with least restrictive AD > 50'x1 with mod I in order to demonstrate ability to negotiate in home environment  4)  Improve overall strength and balance 1/2 grade in order to optimize ability to perform functional tasks and reduce fall risk  5) Increase activity tolerance to 45 minutes in order to improve endurance to functional tasks  6)PT for ongoing patient and family/caregiver education, DME needs and d/c planning in order to promote highest level of function in least restrictive environment  PT Treatment Day 0   Plan   Treatment/Interventions Functional transfer training;LE strengthening/ROM; Therapeutic exercise;Patient/family training;Equipment eval/education; Bed mobility;Gait training; Compensatory technique education;OT   PT Frequency 2-3x/wk   Recommendation   PT Discharge Recommendation Return to previous environment with social support;Home with skilled therapy  (HHPT)   PT - [place] to Discharge Yes   Additional Comments when medically stable   AM-PAC Basic Mobility Inpatient   Turning in Bed Without Bedrails 3   Lying on Back to Sitting on Edge of Flat Bed 3   Moving Bed to Chair 3   Standing Up From Chair 3   Walk in Room 3   Climb 3-5 Stairs 2   Basic Mobility Inpatient Raw Score 17   Basic Mobility Standardized Score 39 67   History: co - morbidities, social background, fall risk, use of assistive device-distances  Exam: impairments in systems including musculoskeletal (strength), neuromuscular (balance, gait, transfers, motor function and sensation),AM-PAC, cognition  Clinical: unstable/unpredictable; abn BG levels  Complexity:high    Cleo Para, PT

## 2021-03-22 NOTE — ASSESSMENT & PLAN NOTE
Lab Results   Component Value Date    EGFR 57 03/22/2021    EGFR 55 03/21/2021    EGFR 43 03/21/2021    CREATININE 1 10 03/22/2021    CREATININE 1 14 03/21/2021    CREATININE 1 41 (H) 03/21/2021     Patient has a history of chronic kidney disease stage 3 with a baseline creatinine that appears to range 1 1-1 49  She presented with an elevated creatinine of 1 7, elevated above her baseline due to her hyperglycemia and HHNK    She did not meet criteria for acute kidney injury however  She was given IV fluids and her creatinine returned to her baseline of 1 10 prior to discharge

## 2021-03-22 NOTE — ASSESSMENT & PLAN NOTE
Patient has a History of coronary artery disease, with a right coronary artery stent placement 2017  No evidence of acute ischemia  She was continued on her home medical regimen with aspirin, Plavix, statin, beta-blocker

## 2021-03-22 NOTE — PLAN OF CARE
Problem: OCCUPATIONAL THERAPY ADULT  Goal: Performs self-care activities at highest level of function for planned discharge setting  See evaluation for individualized goals  Description: Treatment Interventions: ADL retraining, Functional transfer training, UE strengthening/ROM, Activityengagement          See flowsheet documentation for full assessment, interventions and recommendations  Note: Limitation: Decreased ADL status, Decreased UE ROM, Decreased self-care trans, Decreased high-level ADLs  Prognosis: Good  Assessment: Pt admit 3/20/21 with hyperglycemia  OT completed extensive review of pt's medical and social history  Pt with h/o DM, HTN, Seizures, and blindness  Prior to admit was living at [facility] w/ roommate  States she was (I) w/ ADLS until recent due to pacer placement  Facility does IADLS  Ambulates without AD, but states therapy staff recommended RW  However, attended therapy and no longer needed  + falls  Pt presents to OT below baseline due to the following performance deficits: ROM; balance; stand tolerance; functional mobility; community integration; and self care  Therefore, pt would benefit from OT services to achieve optimal level of performance  Occupational performance areas to be addressed include: bathing, toileting, dressing, activity tolerance, functional mobility, and clothing management  Pt s/p recent pacer and still has limitations  Therefore, requires increased assist w/ ADLS  Blind in L eye and decreased vision in R eye  CG arm held assist for transfers and functional mobility w/ directions cues due to decreased vision  Based on findings, pt is of high complexity  The patient's raw score on the AM-PAC Daily Activity inpatient short form is 19, standardized score is 40 22, greater than 39 4  Patients at this level are likely to benefit from discharge to home  Recommend return to [facility] w/ continued assist from staff        OT Discharge Recommendation: Return to previous environment with social support(Continued assist from staff due to pacer )

## 2021-03-22 NOTE — OCCUPATIONAL THERAPY NOTE
Occupational Therapy Evaluation     Patient Name: Candis Smith  KPLLK'N Date: 3/22/2021  Problem List  Active Problems:    Graves disease    Coronary artery disease    Essential hypertension    Stage 3 chronic kidney disease    Type I diabetes mellitus ([facility] )    Subconjunctival hemorrhage    Pacemaker    Past Medical History  Past Medical History:   Diagnosis Date    Diabetes mellitus ([facility] )     Hypertension     Seizures ([facility] )     Type 1 diabetes ([facility] )      Past Surgical History  History reviewed  No pertinent surgical history  03/22/21 0920   OT Last Visit   OT Visit Date 03/22/21   Note Type   Note type Evaluation   Restrictions/Precautions   Weight Bearing Precautions Per Order No   Other Precautions Chair Alarm; Bed Alarm;Limb alert; Fall Risk;Visual impairment;Cardiac/sternal  (Blind L eye; Recent pacer)   Pain Assessment   Pain Assessment Tool Pain Assessment not indicated - pt denies pain   Home Living   Type of Home [facility] )   Home Layout One level   Bathroom Shower/Tub Walk-in shower   Bathroom Toilet Raised   Bathroom Equipment Grab bars in shower; Shower chair;Grab bars around toilet   [address] Other (Comment)  (None per pt )   Additional Comments States therapy wanted her to use a RW, but she got stronger and didn't need  Prior Function   Lives With Other (Comment); Facility staff  (Roommate )   Receives Help From Personal care attendant   ADL Assistance Independent  (Recent assist due to pacer )   IADLs Needs assistance   Falls in the last 6 months 1 to 4   Lifestyle   Autonomy Pt states she was (I) w/ ADLS until her recent pacer  Staff has been assisting her  Facility does IADLS  Ambulates without AD but states therapy wanted her to use a RW due to LE weakness  Reports attended therapy and got stronger and therefore doesn't need RW  + falls      Reciprocal Relationships Roommate   Intrinsic Gratification Bingo but can't see the numbers anymore    Psychosocial   Psychosocial (WDL) WDL   Subjective   Subjective "I may go back today"    ADL   Eating Assistance 5  Supervision/Setup   Eating Deficit Setup   Grooming Assistance 4  Minimal Assistance   Grooming Deficit Brushing hair  (Due to pacer precautions )   UB Bathing Assistance 5  Supervision/Setup   UB Bathing Deficit Setup   LB Bathing Assistance 5  Supervision/Setup   LB Bathing Deficit Setup   UB Dressing Assistance 4  Minimal Assistance   UB Dressing Deficit Setup  (Recent pacer )   LB Dressing Assistance 4  Minimal Assistance   LB Dressing Deficit Setup  (Recent pacer )   Toileting Assistance  4  Minimal Assistance   Toileting Deficit   (Recent pacer )   Additional Comments UE ROM limited due to pacer      Bed Mobility   Additional Comments Received seated on EOB    Transfers   Sit to Stand 5  Supervision   Additional items Increased time required   Stand to Sit 5  Supervision   Additional items Increased time required   Stand pivot   (CG )   Additional items   (Arm held and directional cues )   Functional Mobility   Functional Mobility   (CG arm held and directional cues )   Additional Comments Decreased vision    Balance   Static Sitting Normal   Dynamic Sitting Good   Static Standing Fair   Dynamic Standing Fair -   Ambulatory Fair -   Activity Tolerance   Activity Tolerance Patient tolerated treatment well   Medical Staff Made Aware Student nurse present    Nurse Made Aware RN, OK for eval   RUE Assessment   RUE Assessment WFL  (4+/5)   LUE Assessment   LUE Assessment WFL  (Did not MMT due to recent pacer )   Hand Function   Gross Motor Coordination Functional   Fine Motor Coordination Functional   Sensation   Light Touch No apparent deficits  (Denies numbness)   Vision-Basic Assessment   Current Vision Other (Comment)  (Blind L eye; decreased vision R eye )   Cognition   Overall Cognitive Status WFL   Arousal/Participation Cooperative   Attention Within functional limits   Orientation Level Oriented X4   Memory Decreased recall of recent events   Following Commands Follows multistep commands with increased time or repetition   Assessment   Limitation Decreased ADL status; Decreased UE ROM; Decreased self-care trans;Decreased high-level ADLs   Prognosis Good   Assessment Pt admit 3/20/21 with hyperglycemia  OT completed extensive review of pt's medical and social history  Pt with h/o DM, HTN, Seizures, and blindness  Prior to admit was living at [facility] w/ roommate  States she was (I) w/ ADLS until recent due to pacer placement  Facility does IADLS  Ambulates without AD, but states therapy staff recommended RW  However, attended therapy and no longer needed  + falls  Pt presents to OT below baseline due to the following performance deficits: ROM; balance; stand tolerance; functional mobility; community integration; and self care  Therefore, pt would benefit from OT services to achieve optimal level of performance  Occupational performance areas to be addressed include: bathing, toileting, dressing, activity tolerance, functional mobility, and clothing management  Pt s/p recent pacer and still has limitations  Therefore, requires increased assist w/ ADLS  Blind in L eye and decreased vision in R eye  CG arm held assist for transfers and functional mobility w/ directions cues due to decreased vision  Based on findings, pt is of high complexity  The patient's raw score on the AM-PAC Daily Activity inpatient short form is 19, standardized score is 40 22, greater than 39 4  Patients at this level are likely to benefit from discharge to home  Recommend return to [facility] w/ continued assist from staff  Goals   Patient Goals "To go back to [facility]"    Plan   Treatment Interventions ADL retraining;Functional transfer training;UE strengthening/ROM; Activityengagement   Goal Expiration Date 04/02/21   OT Treatment Day 0   OT Frequency 2-3x/wk   Recommendation   OT Discharge Recommendation Return to previous environment with social support  (Continued assist from staff due to pacer )   AM-PAC Daily Activity Inpatient   Lower Body Dressing 3   Bathing 3   Toileting 3   Upper Body Dressing 3   Grooming 3   Eating 4   Daily Activity Raw Score 19   Daily Activity Standardized Score (Calc for Raw Score >=11) 40 22   AM-PAC Applied Cognition Inpatient   Following a Speech/Presentation 4   Understanding Ordinary Conversation 4   Taking Medications 4   Remembering Where Things Are Placed or Put Away 4   Remembering List of 4-5 Errands 3   Taking Care of Complicated Tasks 3   Applied Cognition Raw Score 22   Applied Cognition Standardized Score 47 83     GOALS:      Pt will complete UB ADL's w/ MI using compensatory techs     Pt will complete LB ADL's w/ MI using AE PRN     Pt will complete toileting including hygiene and clothing management w/ MI      Pt will complete functional transfers w/ MI and VC as needed due to vision     Pt will complete dynamic and unsupported stand balance w/ F+ for safe clothing management      Pt will improve stand tolerance to 5-10 mins for safety w/ ADL tasks      Pt will improve activity tolerance to G for 20- 30 min tx session for increased engagement in functional tasks      Pt will verbalize 3 potential fall hazards and identify techniques to decrease falls in own environment     [facility] MS, OTR/L

## 2021-03-22 NOTE — UTILIZATION REVIEW
Notification of Inpatient Admission/Inpatient Authorization Request   This is a Notification of Inpatient Admission for [address]  Be advised that this patient was admitted to our facility under Inpatient Status  Contact Dunia Dyson at [phone] for additional admission information  [facility] DEPT  DEDICATED -[phone]  Patient Name:   Thi Wilson   YOB: 1965       [address]   [address]:   [facility]  Tax ID: [id number]  NPI: [id number] Attending Provider/NPI:  Phone:  Address: [address]  [phone]  Same as the facility   Place of Service Code: 24 [facility]   Start Date: 3/20/21 2045 Discharge Date & Time: No discharge date for patient encounter  Type of Admission: Inpatient Status Discharge Disposition   (if discharged): Home/Self Care   Patient Diagnoses: Hyperglycemia [R73 9]  HHNC (hyperglycemic hyperosmolar nonketotic coma) ([facility] ) [E11 01]  Hyperosmolar hyperglycemic state (HHS) ([facility] ) [E11 00, E11 65]     Orders: Admission Orders (From admission, onward)     Ordered        03/20/21 2045  Inpatient Admission  Once                    Assigned Utilization Review Contact: [address] [place] [address] Utilization Review Department  Phone: [phone]; Fax [phone]  Email: [email or web address]   ATTENTION PAYERS: Please call the assigned Utilization  directly with any questions or concerns ALL voicemails in the department are confidential  Send all requests for admission clinical reviews, approved or denied determinations and any other requests to dedicated fax number belonging to the campus where the patient is receiving treatment

## 2021-03-22 NOTE — ASSESSMENT & PLAN NOTE
Patient has a history of essential hypertension  She was continued on her home regimen with Norvasc 5 mg daily and atenolol 25 mg daily  Adequate blood pressure control  Continue medications and outpatient follow-up

## 2021-03-22 NOTE — ASSESSMENT & PLAN NOTE
Patient presented with hyperglycemia with a blood sugar of 946  Did not have any elevated anion gap  Did not have diabetic ketoacidosis  She is type 1 diabetic, and was diagnosed with hyperosmolar hyperglycemic state, likely secondary to dietary indiscretion  She was started on insulin infusion  Her hyperglycemia normalized however she had subsequent hypoglycemia  She noted she did not eat dinner prior to the hypoglycemia  Patient was taken off the insulin infusion and restarted on Lantus and pre meal Humalog, with excellent glycemic control  Currently on Lantus 10 units q h s , she will be discharged back on her home regimen of 25 units q h s , as it is anticipated that her home diet is not as restricted as the hospital diabetic diet  She will continue her pre meal sliding scale insulin with Humalog, as prior to admission  Continue Accu-Cheks t i d  A c  Meals as well as q h s   And close monitoring

## 2021-03-22 NOTE — DISCHARGE INSTRUCTIONS
=====================================================================  Discharge instructions    Patient initially presented with hyperglycemia with a blood sugar greater than 900  She was started on insulin drip however had hypoglycemia after she did not eat dinner  Last evening she received Lantus 10 units, and her blood sugars are well controlled today    She is due to return to her previous dose of Lantus 25 units this evening  Please monitor patient's calorie intake today    If she has not been able to eat her typical meals throughout the day please decrease her Lantus dose to 10 units tonight, instead of the 25 units at her scheduled    Please continue to monitor her blood sugars 3 times a day before meals and prior to bedtime  Please continue to follow sliding scale insulin    Please note her Cozaar has been held due to low-normal blood pressure  ==========================================================

## 2021-03-22 NOTE — ASSESSMENT & PLAN NOTE
Patient has a history History of complete heart block, she is status post pacemaker placement  Continue outpatient cardiology follow-up

## 2021-03-22 NOTE — ASSESSMENT & PLAN NOTE
Lab Results   Component Value Date    HGBA1C 11 2 (H) 01/13/2021       Recent Labs     03/21/21  2125 03/22/21  0311 03/22/21  0623 03/22/21  0814   POCGLU 105 149* 139 118       Blood Sugar Average: Last 72 hrs:  (P) 389 [id number]     Patient has a history of type 1 diabetes followed by [facility] endocrinology  A1c was 11, demonstrating poor control  Patient initially presented with hyperglycemia secondary to [facility] and was initiated on insulin infusion  She had subsequent hypoglycemia, after skipping to and her insulin infusion was discontinued  Currently on Lantus 10 units q h s   And pre meal Humalog with sliding scale  She will be discharged to continue her home regimen of Lantus 25 units q h s, with close monitoring of her Accu-Cheks, as it is anticipated that her home diet is much less restricted than her hospital diabetic diet, with an A1c of 11  She will continue her pre meal sliding scale insulin with Humalog  Continue close monitoring with Accu-Cheks  Follow-up with outpatient endocrinology

## 2021-03-22 NOTE — ASSESSMENT & PLAN NOTE
History of retinopathy medical blindness from type 1 diabetes  She did have syncope with a subsequent fall 3/14 and was evaluated in the ER  Upon presenting to the ER on 03/20 patient had a CT scan of her brain without any acute abnormalities  She denies any headache, or facial pain  She is noted to have a subsequent right eye subconjunctival hemorrhage  Reports no eye pain or change in her baseline level of vision

## 2021-03-22 NOTE — PLAN OF CARE
Problem: PHYSICAL THERAPY ADULT  Goal: Performs mobility at highest level of function for planned discharge setting  See evaluation for individualized goals  Description: Treatment/Interventions: Functional transfer training, LE strengthening/ROM, Therapeutic exercise, Patient/family training, Equipment eval/education, Bed mobility, Gait training, Compensatory technique education, OT          See flowsheet documentation for full assessment, interventions and recommendations  Note: Prognosis: Good  Problem List: Decreased strength, Impaired balance, Impaired vision, Decreased skin integrity  Assessment: Adore Connelly is a 50 y o  female admitted to [facility] on 3/20/2021 for Hyperosmolar hyperglycemic state (HHS) ([facility] [address] )  PT was consulted and pt was seen on 3/22/2021 for mobility assessment and d/c planning  Pt presents high fall risk, visual impairment  Pt is currently functioning at a supervision assistance x1 level for bed mobility, supervision assistance x1 level for transfers, minimum assistance x1 level for ambulation with HHA  Pt require HHA and verbal guidance during ambulation given visual impairment  Able to see general colors/objects but had poor obstacle avoidance without assistance  Pt reports no difficulty ambulating at [facility] because she is familiar with the environment  Has access to a RW and wc if needed  Is interested in continuing PT upon d/c  Pt will benefit from continued skilled IP PT to address the above mentioned impairments  in order to maximize recovery and increase functional independence when completing mobility and ADLs  At this time PT recommendations for d/c are return to prev environment w HHPT  End of session pt repositioned in bed, alarm engaged and all needs in reach    Barriers to Discharge: None     PT Discharge Recommendation: Return to previous environment with social support, Home with skilled therapy(HHPT)     PT - OK to Discharge: Yes    See flowsheet documentation for full assessment

## 2021-03-22 NOTE — ASSESSMENT & PLAN NOTE
Patient presented with hyponatremia, with Pseudohyponatremia in the setting of hyperglycemia,   Initial sodium of 125 corrected to 139   Patient was treated for her hyperglycemia and her sodium normalized    Recent Labs     03/20/21  1947 03/21/21  0042 03/21/21  1056 03/22/21  0527   SODIUM 125* 142 140 145

## 2021-03-22 NOTE — DISCHARGE SUMMARY
[address]  Discharge- [facility] 1965, 50 y o  female MRN: [id number]  Unit/Bed#: E2 -01 Encounter: [id number]  Primary Care Provider: Rhea Eaton MD   Date and time admitted to hospital: 3/20/2021  7:38 PM          Addendum:  Patient's blood sugars noted to be labile throughout the day, discharge canceled  Continue to titrate glycemic control    Admission Date: 3/20/2021       Discharge Date:  03/22/2021        Primary Diagnoses  Principal Problem:    Hyperosmolar hyperglycemic state (HHS) ([address] )  Active Problems:    Type I diabetes mellitus ([facility] [address] )    Graves disease    Coronary artery disease    Essential hypertension    Stage 3 chronic kidney disease    Subconjunctival hemorrhage    Complete heart block ([facility] [address] )  Resolved Problems:    Hyponatremia      Hospital Course by problem:  * Hyperosmolar hyperglycemic state (HHS) ([address] )  Assessment & Plan  Patient presented with hyperglycemia with a blood sugar of 946  Did not have any elevated anion gap  Did not have diabetic ketoacidosis  She is type 1 diabetic, and was diagnosed with hyperosmolar hyperglycemic state, likely secondary to dietary indiscretion  She was started on insulin infusion  Her hyperglycemia normalized however she had subsequent hypoglycemia    She noted she did not eat dinner prior to the hypoglycemia  Patient was taken off the insulin infusion and restarted on Lantus and pre meal Humalog, with excellent glycemic control  Currently on Lantus 10 units q h s , she will be discharged back on her home regimen of 25 units q h s , as it is anticipated that her home diet is not as restricted as the hospital diabetic diet  However the nursing home staff was instructed that if she does not eat her typical amount at meals' at her insulin doses to be decreased to 10 units of Lantus this evening to avoid hypoglycemia  She will continue her pre meal sliding scale insulin with Humalog, as prior to admission  Continue Accu-Cheks t i d  A c  Meals as well as q h s  And close monitoring    Type I diabetes mellitus [facility])  Assessment & Plan  Lab Results   Component Value Date    HGBA1C 11 2 (H) 01/13/2021       Recent Labs     03/21/21  2125 03/22/21  0311 03/22/21  0623 03/22/21  0814   POCGLU 105 149* 139 118       Blood Sugar Average: Last 72 hrs:  (P) 358 [id number]     Patient has a history of type 1 diabetes followed by [facility] endocrinology  A1c was 11, demonstrating poor control  Patient initially presented with hyperglycemia secondary to [facility] and was initiated on insulin infusion  She had subsequent hypoglycemia, after skipping to and her insulin infusion was discontinued  Currently on Lantus 10 units q h s  And pre meal Humalog with sliding scale  She will be discharged to continue her home regimen of Lantus 25 units q h s, with close monitoring of her Accu-Cheks, as it is anticipated that her home diet is much less restricted than her hospital diabetic diet, with an A1c of 11  If patient's blood sugars are noted to be low-normal or if she has poor p o   Intake today she should take 10 units of Lantus this evening  She will continue her pre meal sliding scale insulin with Humalog  Continue close monitoring with Accu-Cheks  Follow-up with outpatient endocrinology    Complete heart block [facility])  Assessment & Plan  Patient has a history History of complete heart block, she is status post pacemaker placement  Continue outpatient cardiology follow-up    Subconjunctival hemorrhage  Assessment & Plan  History of retinopathy medical blindness from type 1 diabetes  She did have syncope with a subsequent fall 3/14 and was evaluated in the ER  Upon presented to the ER this admission she had a CT scan of her brain without any acute abnormalities  She is noted to have a subsequent right eye subconjunctival hemorrhage  Reports no eye pain or change in her baseline level of vision    Stage 3 chronic kidney disease  Assessment & Plan  Lab Results   Component Value Date    EGFR 57 03/22/2021    EGFR 55 03/21/2021    EGFR 43 03/21/2021    CREATININE 1 10 03/22/2021    CREATININE 1 14 03/21/2021    CREATININE 1 41 (H) 03/21/2021     Patient has a history of chronic kidney disease stage 3 with a baseline creatinine that appears to range 1 1-1 49  She presented with an elevated creatinine of 1 7, elevated above her baseline due to her hyperglycemia and HHNK    She did not meet criteria for acute kidney injury however  She was given IV fluids and her creatinine returned to her baseline of 1 10 prior to discharge    Essential hypertension  Assessment & Plan  Patient has a history of essential hypertension  She was continued on her home regimen with Norvasc 5 mg daily and atenolol 25 mg daily  Her Cozaar was held due to low-normal blood pressure  This will be held at discharge to avoid hypotension, will be re-evaluated by her PCP at her nursing home  Adequate blood pressure control  Continue medications and outpatient follow-up     Coronary artery disease  Assessment & Plan  Patient has a History of coronary artery disease, with a right coronary artery stent placement 2017  No evidence of acute ischemia  She was continued on her home medical regimen with aspirin, Plavix, statin, beta-blocker        Graves disease  Assessment & Plan  Patient has a history of Graves disease  Followed by endocrinology as an outpatient  Currently on Tapazole 10 milligrams twice a day   Continue home regimen outpatient endocrinology follow-up    Hyponatremia-resolved as of 3/22/2021  Assessment & Plan  Patient presented with hyponatremia, with Pseudohyponatremia in the setting of hyperglycemia,   Initial sodium of 125 corrected to 139   Patient was treated for her hyperglycemia and her sodium normalized    Recent Labs     03/20/21  1947 03/21/21  0042 03/21/21  1056 03/22/21  0527   SODIUM 125* 142 140 145       Service:  [facility], [facility]  Consulting Providers   None    Procedures Performed   None    Hospital Studies:  3/20 CT head:  No acute intracranial abnormality    Results from last 7 days   Lab Units 03/22/21  0527 03/21/21  0535 03/20/21  1947   WBC Thousand/uL 7 72 8 32 6 32   HEMOGLOBIN g/dL 12 9 12 7 12 3   HEMATOCRIT % 40 0 39 1 37 0   PLATELETS Thousands/uL 295 296 270     Results from last 7 days   Lab Units 03/22/21  0527 03/21/21  1056 03/21/21  0042   POTASSIUM mmol/L 4 6 5 3 3 5   CHLORIDE mmol/L 112* 108 108   CO2 mmol/L 27 25 23   BUN mg/dL 15 20 28*   CREATININE mg/dL 1 10 1 14 1 41*   CALCIUM mg/dL 8 8 9 0 9 0       History and Physical Exam:  Please refer to the Admission H&P note    Discharge Condition: Improved  Discharge Disposition: Home/Self Care    Discharge Note and Physical Exam:   Patient denies any complaints  She notes she feels well  She states she feels at her baseline and tolerated breakfast without any difficulties  She did not eat dinner last night  She denies any headache  She denies any eye pain  She denies any change in her baseline vision  She notes she is completely blind in her left eye and has almost no vision in her right eye  She denies any right eye pain or change in her right eye vision since her fall 3/14  She denies any dizziness or lightheadedness  She notes she is ambulating and feels her gait is at her baseline  She denies any numbness or decreased sensation  She denies any focal weakness  She denies any chest pain  She denies any shortness of breath or cough  She denies any abdominal pain  She denies any nausea, vomiting, diarrhea  She is tolerating p o  Larri Swati Vitals:    03/22/21 0720   BP: 132/78   Pulse: 70   Resp: 16   Temp: 97 8 °F (36 6 °C)   SpO2: 98%     General:  Pleasant, non-tachypnic, non-dyspnic  Conversant  Sitting up at the edge of the bed  Smiling  Interactive  Communicative    Cooperates with exam  HEENT:  Right eye subconjunctival hemorrhage noted  Heart: Regular rate and rhythm, S1S2 present  No murmur, rub or gallop  Lungs: Clear to auscultation bilaterally, no wheezing, rhonchi, or crackles  Good air movement  No accessory muscle use or respiratory distress  Abdomen: soft, non-tender, non-distended, NABS  No rebound or guarding  No mass or peritoneal signs  Extremities: no clubbing, cyanosis or edema  2+ pedal pulses bilaterally  Neurologic:  Awake and alert  Interactive  Fluent speech  Moving all 4 extremities symmetrically  Cooperates with exam  Skin: warm and dry  No petechiae, purpura or rash  Discharge Medications   Please see Medical Reconciliation Discharge Form    Discharge Follow Up Appointments:   Brook Dietrich MD: 1 week    Discharge  Statement   Total Time Spent today including physical exam, discussion with patient, and discharge arrangements/care = 36 minutes      D/w pt's nurse at bedside  D/w     Family:  Called primary contact Yanriaritesh Franco (guardian) and LM to call my cell number for update    This note has been constructed using a voice recognition system

## 2021-03-23 LAB
GLUCOSE SERPL-MCNC: 104 MG/DL (ref 65–140)
GLUCOSE SERPL-MCNC: 109 MG/DL (ref 65–140)
GLUCOSE SERPL-MCNC: 144 MG/DL (ref 65–140)
GLUCOSE SERPL-MCNC: 151 MG/DL (ref 65–140)
GLUCOSE SERPL-MCNC: 169 MG/DL (ref 65–140)
GLUCOSE SERPL-MCNC: 203 MG/DL (ref 65–140)
GLUCOSE SERPL-MCNC: 319 MG/DL (ref 65–140)
GLUCOSE SERPL-MCNC: 336 MG/DL (ref 65–140)
GLUCOSE SERPL-MCNC: 82 MG/DL (ref 65–140)

## 2021-03-23 PROCEDURE — 82948 REAGENT STRIP/BLOOD GLUCOSE: CPT

## 2021-03-23 PROCEDURE — 99232 SBSQ HOSP IP/OBS MODERATE 35: CPT | Performed by: INTERNAL MEDICINE

## 2021-03-23 RX ADMIN — ASPIRIN 81 MG: 81 TABLET, COATED ORAL at 09:59

## 2021-03-23 RX ADMIN — METOCLOPRAMIDE 5 MG: 10 TABLET ORAL at 17:14

## 2021-03-23 RX ADMIN — INSULIN GLARGINE 25 UNITS: 100 INJECTION, SOLUTION SUBCUTANEOUS at 21:37

## 2021-03-23 RX ADMIN — METOCLOPRAMIDE 5 MG: 10 TABLET ORAL at 21:37

## 2021-03-23 RX ADMIN — METHIMAZOLE 10 MG: 5 TABLET ORAL at 21:37

## 2021-03-23 RX ADMIN — ATENOLOL 25 MG: 25 TABLET ORAL at 11:04

## 2021-03-23 RX ADMIN — INSULIN LISPRO 5 UNITS: 100 INJECTION, SOLUTION INTRAVENOUS; SUBCUTANEOUS at 09:56

## 2021-03-23 RX ADMIN — INSULIN LISPRO 1 UNITS: 100 INJECTION, SOLUTION INTRAVENOUS; SUBCUTANEOUS at 12:42

## 2021-03-23 RX ADMIN — METOCLOPRAMIDE 5 MG: 10 TABLET ORAL at 09:59

## 2021-03-23 RX ADMIN — CLOPIDOGREL BISULFATE 75 MG: 75 TABLET ORAL at 09:59

## 2021-03-23 RX ADMIN — METOCLOPRAMIDE 5 MG: 10 TABLET ORAL at 12:42

## 2021-03-23 RX ADMIN — PANTOPRAZOLE SODIUM 20 MG: 20 TABLET, DELAYED RELEASE ORAL at 05:40

## 2021-03-23 RX ADMIN — AMLODIPINE BESYLATE 5 MG: 5 TABLET ORAL at 09:59

## 2021-03-23 RX ADMIN — ATORVASTATIN CALCIUM 80 MG: 80 TABLET, FILM COATED ORAL at 17:14

## 2021-03-23 RX ADMIN — METHIMAZOLE 10 MG: 5 TABLET ORAL at 09:58

## 2021-03-23 RX ADMIN — INSULIN LISPRO 5 UNITS: 100 INJECTION, SOLUTION INTRAVENOUS; SUBCUTANEOUS at 17:13

## 2021-03-23 RX ADMIN — INSULIN LISPRO 1 UNITS: 100 INJECTION, SOLUTION INTRAVENOUS; SUBCUTANEOUS at 17:13

## 2021-03-23 RX ADMIN — VENLAFAXINE HYDROCHLORIDE 75 MG: 75 CAPSULE, EXTENDED RELEASE ORAL at 09:58

## 2021-03-23 RX ADMIN — INSULIN LISPRO 3 UNITS: 100 INJECTION, SOLUTION INTRAVENOUS; SUBCUTANEOUS at 09:56

## 2021-03-23 RX ADMIN — INSULIN LISPRO 5 UNITS: 100 INJECTION, SOLUTION INTRAVENOUS; SUBCUTANEOUS at 12:42

## 2021-03-23 NOTE — ASSESSMENT & PLAN NOTE
Lab Results   Component Value Date    HGBA1C 11 2 (H) 01/13/2021       Recent Labs     03/23/21  0752 03/23/21  1014 03/23/21  1238 03/23/21  1621   POCGLU 319* 336* 203* 169*       Blood Sugar Average: Last 72 hrs:  (P) 670 [id number]     Patient has a history of type 1 diabetes followed by [facility] endocrinology  A1c was 11, demonstrating poor control  Patient initially presented with hyperglycemia secondary to [facility] and was initiated on insulin infusion  She had subsequent hypoglycemia, after skipping to and her insulin infusion was discontinued  Currently on Lantus 25 units at bedtime and lispro 5 units t i d   With meals  Continue close monitoring with Accu-Cheks  Follow-up with outpatient endocrinology

## 2021-03-23 NOTE — ASSESSMENT & PLAN NOTE
Lab Results   Component Value Date    EGFR 57 03/22/2021    EGFR 55 03/21/2021    EGFR 43 03/21/2021    CREATININE 1 10 03/22/2021    CREATININE 1 14 03/21/2021    CREATININE 1 41 (H) 03/21/2021     Patient has a history of chronic kidney disease stage 3 with a baseline creatinine that appears to range 1 1-1 49  She presented with an elevated creatinine of 1 7, elevated above her baseline due to her hyperglycemia and HHNK    She did not meet criteria for acute kidney injury however  She was given IV fluids and her creatinine improved

## 2021-03-23 NOTE — CASE MANAGEMENT
Pt was transfer from E2 to E4 today  LOS is 2 days, no bundle, no 30 days readmission and unplanned readmission score is 11  PCP Dr Rut Hobson  Pt lives in \[facility] at [facility] and is a MA bed hold  Pt has hx of cognitive impairment and seizures  No hx BH or D&A  Pt's  is [employer] from 's  The plan is for pt to return to [address] once medical cleared  A referral was made back to [address]  Pt will need transport and COVID testing

## 2021-03-23 NOTE — PLAN OF CARE
Problem: Potential for Falls  Goal: Patient will remain free of falls  Description: INTERVENTIONS:  - Assess patient frequently for physical needs  -  Identify cognitive and physical deficits and behaviors that affect risk of falls  -  [place] fall precautions as indicated by assessment   - Educate patient/family on patient safety including physical limitations  - Instruct patient to call for assistance with activity based on assessment  - Modify environment to reduce risk of injury  - Consider OT/PT consult to assist with strengthening/mobility  Outcome: Progressing     Problem: PAIN - ADULT  Goal: Verbalizes/displays adequate comfort level or baseline comfort level  Description: Interventions:  - Encourage patient to monitor pain and request assistance  - Assess pain using appropriate pain scale  - Administer analgesics based on type and severity of pain and evaluate response  - Implement non-pharmacological measures as appropriate and evaluate response  - Consider cultural and social influences on pain and pain management  - Notify physician/advanced practitioner if interventions unsuccessful or patient reports new pain  Outcome: Progressing     Problem: SAFETY ADULT  Goal: Patient will remain free of falls  Description: INTERVENTIONS:  - Assess patient frequently for physical needs  -  Identify cognitive and physical deficits and behaviors that affect risk of falls    -  [place] fall precautions as indicated by assessment   - Educate patient/family on patient safety including physical limitations  - Instruct patient to call for assistance with activity based on assessment  - Modify environment to reduce risk of injury  - Consider OT/PT consult to assist with strengthening/mobility  Outcome: Progressing  Goal: Maintain or return to baseline ADL function  Description: INTERVENTIONS:  -  Assess patient's ability to carry out ADLs; assess patient's baseline for ADL function and identify physical deficits which impact ability to perform ADLs (bathing, care of mouth/teeth, toileting, grooming, dressing, etc )  - Assess/evaluate cause of self-care deficits   - Assess range of motion  - Assess patient's mobility; develop plan if impaired  - Assess patient's need for assistive devices and provide as appropriate  - Encourage maximum independence but intervene and supervise when necessary  - Involve family in performance of ADLs  - Assess for home care needs following discharge   - Consider OT consult to assist with ADL evaluation and planning for discharge  - Provide patient education as appropriate  Outcome: Progressing  Goal: Maintain or return mobility status to optimal level  Description: INTERVENTIONS:  - Assess patient's baseline mobility status (ambulation, transfers, stairs, etc )    - Identify cognitive and physical deficits and behaviors that affect mobility  - Identify mobility aids required to assist with transfers and/or ambulation (gait belt, sit-to-stand, lift, walker, cane, etc )  - [place] fall precautions as indicated by assessment  - Record patient progress and toleration of activity level on Mobility SBAR; progress patient to next Phase/Stage  - Instruct patient to call for assistance with activity based on assessment  - Consider rehabilitation consult to assist with strengthening/weightbearing, etc   Outcome: Progressing     Problem: DISCHARGE PLANNING  Goal: Discharge to home or other facility with appropriate resources  Description: INTERVENTIONS:  - Identify barriers to discharge w/patient and caregiver  - Arrange for needed discharge resources and transportation as appropriate  - Identify discharge learning needs (meds, wound care, etc )  - Arrange for interpretive services to assist at discharge as needed  - Refer to Case Management Department for coordinating discharge planning if the patient needs post-hospital services based on physician/advanced practitioner order or complex needs related to functional status, cognitive ability, or social support system  Outcome: Progressing     Problem: Knowledge Deficit  Goal: Patient/family/caregiver demonstrates understanding of disease process, treatment plan, medications, and discharge instructions  Description: Complete learning assessment and assess knowledge base    Interventions:  - Provide teaching at level of understanding  - Provide teaching via preferred learning methods  Outcome: Progressing     Problem: METABOLIC, FLUID AND ELECTROLYTES - ADULT  Goal: Glucose maintained within target range  Description: INTERVENTIONS:  - Monitor Blood Glucose as ordered  - Assess for signs and symptoms of hyperglycemia and hypoglycemia  - Administer ordered medications to maintain glucose within target range  - Assess nutritional intake and initiate nutrition service referral as needed  Outcome: Progressing

## 2021-03-23 NOTE — ASSESSMENT & PLAN NOTE
Patient has a history of essential hypertension  She was continued on her home regimen with Norvasc 5 mg daily and atenolol 25 mg daily  Adequate blood pressure control

## 2021-03-23 NOTE — PROGRESS NOTES
[facility]  Progress Note - Candis Smith 1965, 49 y o  female MRN: [id number]  Unit/Bed#: E4 -01 Encounter: [id number]  Primary Care Provider: Yasmin Layne MD   Date and time admitted to hospital: 3/20/2021  7:38 PM    Type I diabetes mellitus [facility])  Assessment & Plan  Lab Results   Component Value Date    HGBA1C 11 2 (H) 01/13/2021       Recent Labs     03/23/21  0752 03/23/21  1014 03/23/21  1238 03/23/21  1621   POCGLU 319* 336* 203* 169*       Blood Sugar Average: Last 72 hrs:  (P) 463 [id number]     Patient has a history of type 1 diabetes followed by [facility] endocrinology  A1c was 11, demonstrating poor control  Patient initially presented with hyperglycemia secondary to [facility] and was initiated on insulin infusion  She had subsequent hypoglycemia, after skipping to and her insulin infusion was discontinued  Currently on Lantus 25 units at bedtime and lispro 5 units t i d   With meals  Continue close monitoring with Accu-Cheks  Follow-up with outpatient endocrinology    Pacemaker  Assessment & Plan  Patient has a history History of complete heart block, she is status post pacemaker placement  Continue outpatient cardiology follow-up    Subconjunctival hemorrhage  Assessment & Plan  History of retinopathy medical blindness from type 1 diabetes  She did have syncope with a subsequent fall 3/14 and was evaluated in the ER  Upon presenting to the ER on 03/20 patient had a CT scan of her brain without any acute abnormalities  She denies any headache, or facial pain  She is noted to have a subsequent right eye subconjunctival hemorrhage  Reports no eye pain or change in her baseline level of vision    Stage 3 chronic kidney disease  Assessment & Plan  Lab Results   Component Value Date    EGFR 57 03/22/2021    EGFR 55 03/21/2021    EGFR 43 03/21/2021    CREATININE 1 10 03/22/2021    CREATININE 1 14 03/21/2021    CREATININE 1 41 (H) 03/21/2021     Patient has a history of chronic kidney disease stage 3 with a baseline creatinine that appears to range 1 1-1 49  She presented with an elevated creatinine of 1 7, elevated above her baseline due to her hyperglycemia and HHNK  She did not meet criteria for acute kidney injury however  She was given IV fluids and her creatinine improved    Essential hypertension  Assessment & Plan  Patient has a history of essential hypertension  She was continued on her home regimen with Norvasc 5 mg daily and atenolol 25 mg daily  Adequate blood pressure control    Coronary artery disease  Assessment & Plan  Patient has a History of coronary artery disease, with a right coronary artery stent placement 2017  No evidence of acute ischemia  She was continued on her home medical regimen with aspirin, Plavix, statin, beta-blocker        Graves disease  Assessment & Plan  Patient has a history of Graves disease  Followed by endocrinology as an outpatient  Currently on Tapazole 10 milligrams twice a day   Continue home regimen outpatient endocrinology follow-up          VTE Pharmacologic Prophylaxis:   Pharmacologic: encourage early ambulation    Mechanical VTE Prophylaxis in Place: scd    Patient Centered Rounds: I have performed bedside rounds with nursing staff today  Time Spent for Care: 20 minutes  More than 50% of total time spent on counseling and coordination of care as described above      Current Length of Stay: 3 day(s)    Current Patient Status: Inpatient   Certification Statement: The patient will continue to require additional inpatient hospital stay due to Labile blood sugars    Discharge Plan / Estimated Discharge Date: 24H    Code Status: Level 1 - Full Code      Subjective:   Patient seen and examined at bedside, currently denies any complaints    Objective:     Vitals:   Temp (24hrs), Av 9 °F (36 6 °C), Min:97 7 °F (36 5 °C), Max:98 3 °F (36 8 °C)    Temp:  [97 7 °F (36 5 °C)-98 3 °F (36 8 °C)] 97 8 °F (36 6 °C)  HR: [69-78] 69  Resp:  [18-20] 18  BP: (105-165)/(56-82) 105/56  SpO2:  [96 %-99 %] 98 %  Body mass index is 23 08 kg/m²  Input and Output Summary (last 24 hours): Intake/Output Summary (Last 24 hours) at 3/23/2021 1837  Last data filed at 3/23/2021 0501  Gross per 24 hour   Intake 220 ml   Output --   Net 220 ml       Physical Exam:    Constitutional: Patient is oriented to person, place and time, no acute distress  HEENT:  Normocephalic, atraumatic, right eye subconjuctival hemorrhage noted  Cardiovascular: Normal S1S2, RRR, No murmurs/rubs/gallops appreciated  Pulmonary:  Bilateral air entry, No rhonchi/rales/wheezing appreciated  Abdominal: Soft, Bowel sounds present, Non-tender, Non-distended  Extremities:  No cyanosis, clubbing or edema  Neurological:  Awake, alert, interactive  Skin:  Warm, dry    Additional Data:     Labs:    Results from last 7 days   Lab Units 03/22/21  0527   WBC Thousand/uL 7 72   HEMOGLOBIN g/dL 12 9   HEMATOCRIT % 40 0   PLATELETS Thousands/uL 295   NEUTROS PCT % 64   LYMPHS PCT % 26   MONOS PCT % 8   EOS PCT % 1     Results from last 7 days   Lab Units 03/22/21 0527 03/20/21  1947   POTASSIUM mmol/L 4 6   < > 5 6*   CHLORIDE mmol/L 112*   < > 91*   CO2 mmol/L 27   < > 24   BUN mg/dL 15   < > 31*   CREATININE mg/dL 1 10   < > 1 74*   CALCIUM mg/dL 8 8   < > 9 4   ALK PHOS U/L  --   --  128*   ALT U/L  --   --  26   AST U/L  --   --  10    < > = values in this interval not displayed  I Have Reviewed All Lab Data Listed Above  Recent Cultures (last 7 days):     Results from last 7 days   Lab Units 03/20/21 2128   BLOOD CULTURE  No Growth at 48 hrs  No Growth at 48 hrs         Last 24 Hours Medication List:   Current Facility-Administered Medications   Medication Dose Route Frequency Provider Last Rate    acetaminophen  650 mg Oral Q6H PRN Jovon Angela, CRNP      amLODIPine  5 mg Oral Daily Galo Carlin DO      aspirin  81 mg Oral Daily Galo PRICE Raegan, DO      atenolol  25 mg Oral Daily Galo Carlin, DO      atorvastatin  80 mg Oral QPM Galo Carlin, DO      clopidogrel  75 mg Oral Daily Galo Carlin, DO      insulin glargine  25 Units Subcutaneous HS Jeferson Barbosa MD      insulin lispro  1-5 Units Subcutaneous TID [facility] Jeferson Barbosa MD      insulin lispro  1-5 Units Subcutaneous HS Jeferson Barbosa MD      insulin lispro  5 Units Subcutaneous TID With Meals Jeferson Barbosa MD      methimazole  10 mg Oral Q12H [facility] Khanyamel, DO      metoclopramide  5 mg Oral 4x Daily WING Stanton      ondansetron  4 mg Intravenous Once PRN Cyndi Roberson PA-C      pantoprazole  20 mg Oral Early Morning Galo Carlin, DO      venlafaxine  75 mg Oral Daily Galo Carlin, DO      zolpidem  5 mg Oral HS PRN WING Stanton          Today, Patient Was Seen By: Dusty Pemberton MD

## 2021-03-23 NOTE — PLAN OF CARE
Problem: Potential for Falls  Goal: Patient will remain free of falls  Description: INTERVENTIONS:  - Assess patient frequently for physical needs  -  Identify cognitive and physical deficits and behaviors that affect risk of falls  -  [place] fall precautions as indicated by assessment   - Educate patient/family on patient safety including physical limitations  - Instruct patient to call for assistance with activity based on assessment  - Modify environment to reduce risk of injury  - Consider OT/PT consult to assist with strengthening/mobility  Outcome: Progressing     Problem: PAIN - ADULT  Goal: Verbalizes/displays adequate comfort level or baseline comfort level  Description: Interventions:  - Encourage patient to monitor pain and request assistance  - Assess pain using appropriate pain scale  - Administer analgesics based on type and severity of pain and evaluate response  - Implement non-pharmacological measures as appropriate and evaluate response  - Consider cultural and social influences on pain and pain management  - Notify physician/advanced practitioner if interventions unsuccessful or patient reports new pain  Outcome: Progressing     Problem: SAFETY ADULT  Goal: Patient will remain free of falls  Description: INTERVENTIONS:  - Assess patient frequently for physical needs  -  Identify cognitive and physical deficits and behaviors that affect risk of falls    -  [place] fall precautions as indicated by assessment   - Educate patient/family on patient safety including physical limitations  - Instruct patient to call for assistance with activity based on assessment  - Modify environment to reduce risk of injury  - Consider OT/PT consult to assist with strengthening/mobility  Outcome: Progressing  Goal: Maintain or return to baseline ADL function  Description: INTERVENTIONS:  -  Assess patient's ability to carry out ADLs; assess patient's baseline for ADL function and identify physical deficits which impact ability to perform ADLs (bathing, care of mouth/teeth, toileting, grooming, dressing, etc )  - Assess/evaluate cause of self-care deficits   - Assess range of motion  - Assess patient's mobility; develop plan if impaired  - Assess patient's need for assistive devices and provide as appropriate  - Encourage maximum independence but intervene and supervise when necessary  - Involve family in performance of ADLs  - Assess for home care needs following discharge   - Consider OT consult to assist with ADL evaluation and planning for discharge  - Provide patient education as appropriate  Outcome: Progressing  Goal: Maintain or return mobility status to optimal level  Description: INTERVENTIONS:  - Assess patient's baseline mobility status (ambulation, transfers, stairs, etc )    - Identify cognitive and physical deficits and behaviors that affect mobility  - Identify mobility aids required to assist with transfers and/or ambulation (gait belt, sit-to-stand, lift, walker, cane, etc )  - [place] fall precautions as indicated by assessment  - Record patient progress and toleration of activity level on Mobility SBAR; progress patient to next Phase/Stage  - Instruct patient to call for assistance with activity based on assessment  - Consider rehabilitation consult to assist with strengthening/weightbearing, etc   Outcome: Progressing     Problem: DISCHARGE PLANNING  Goal: Discharge to home or other facility with appropriate resources  Description: INTERVENTIONS:  - Identify barriers to discharge w/patient and caregiver  - Arrange for needed discharge resources and transportation as appropriate  - Identify discharge learning needs (meds, wound care, etc )  - Arrange for interpretive services to assist at discharge as needed  - Refer to Case Management Department for coordinating discharge planning if the patient needs post-hospital services based on physician/advanced practitioner order or complex needs related to functional status, cognitive ability, or social support system  Outcome: Progressing     Problem: Knowledge Deficit  Goal: Patient/family/caregiver demonstrates understanding of disease process, treatment plan, medications, and discharge instructions  Description: Complete learning assessment and assess knowledge base    Interventions:  - Provide teaching at level of understanding  - Provide teaching via preferred learning methods  Outcome: Progressing     Problem: METABOLIC, FLUID AND ELECTROLYTES - ADULT  Goal: Glucose maintained within target range  Description: INTERVENTIONS:  - Monitor Blood Glucose as ordered  - Assess for signs and symptoms of hyperglycemia and hypoglycemia  - Administer ordered medications to maintain glucose within target range  - Assess nutritional intake and initiate nutrition service referral as needed  Outcome: Progressing

## 2021-03-24 VITALS
TEMPERATURE: 97.8 F | SYSTOLIC BLOOD PRESSURE: 126 MMHG | HEART RATE: 65 BPM | RESPIRATION RATE: 18 BRPM | DIASTOLIC BLOOD PRESSURE: 65 MMHG | BODY MASS INDEX: 22.51 KG/M2 | HEIGHT: 61 IN | WEIGHT: 119.2 LBS | OXYGEN SATURATION: 98 %

## 2021-03-24 LAB
FLUAV RNA RESP QL NAA+PROBE: NEGATIVE
FLUBV RNA RESP QL NAA+PROBE: NEGATIVE
GLUCOSE SERPL-MCNC: 184 MG/DL (ref 65–140)
GLUCOSE SERPL-MCNC: 336 MG/DL (ref 65–140)
GLUCOSE SERPL-MCNC: 49 MG/DL (ref 65–140)
RSV RNA RESP QL NAA+PROBE: NEGATIVE
SARS-COV-2 RNA RESP QL NAA+PROBE: NEGATIVE

## 2021-03-24 PROCEDURE — 97110 THERAPEUTIC EXERCISES: CPT

## 2021-03-24 PROCEDURE — 0241U HB NFCT DS VIR RESP RNA 4 TRGT: CPT | Performed by: INTERNAL MEDICINE

## 2021-03-24 PROCEDURE — 97116 GAIT TRAINING THERAPY: CPT

## 2021-03-24 PROCEDURE — 82948 REAGENT STRIP/BLOOD GLUCOSE: CPT

## 2021-03-24 PROCEDURE — 99239 HOSP IP/OBS DSCHRG MGMT >30: CPT | Performed by: INTERNAL MEDICINE

## 2021-03-24 RX ORDER — METHIMAZOLE 10 MG/1
10 TABLET ORAL 2 TIMES DAILY
Qty: 60 TABLET | Refills: 0
Start: 2021-03-24 | End: 2021-03-24 | Stop reason: SDUPTHER

## 2021-03-24 RX ORDER — METHIMAZOLE 10 MG/1
20 TABLET ORAL DAILY
Qty: 60 TABLET | Refills: 0
Start: 2021-03-24 | End: 2021-04-23

## 2021-03-24 RX ADMIN — VENLAFAXINE HYDROCHLORIDE 75 MG: 75 CAPSULE, EXTENDED RELEASE ORAL at 08:42

## 2021-03-24 RX ADMIN — ASPIRIN 81 MG: 81 TABLET, COATED ORAL at 08:42

## 2021-03-24 RX ADMIN — INSULIN LISPRO 3 UNITS: 100 INJECTION, SOLUTION INTRAVENOUS; SUBCUTANEOUS at 12:28

## 2021-03-24 RX ADMIN — PANTOPRAZOLE SODIUM 20 MG: 20 TABLET, DELAYED RELEASE ORAL at 05:11

## 2021-03-24 RX ADMIN — AMLODIPINE BESYLATE 5 MG: 5 TABLET ORAL at 08:43

## 2021-03-24 RX ADMIN — ATENOLOL 25 MG: 25 TABLET ORAL at 09:59

## 2021-03-24 RX ADMIN — METOCLOPRAMIDE 5 MG: 10 TABLET ORAL at 08:42

## 2021-03-24 RX ADMIN — INSULIN LISPRO 1 UNITS: 100 INJECTION, SOLUTION INTRAVENOUS; SUBCUTANEOUS at 08:45

## 2021-03-24 RX ADMIN — METOCLOPRAMIDE 5 MG: 10 TABLET ORAL at 12:30

## 2021-03-24 RX ADMIN — METHIMAZOLE 10 MG: 5 TABLET ORAL at 08:42

## 2021-03-24 RX ADMIN — INSULIN LISPRO 5 UNITS: 100 INJECTION, SOLUTION INTRAVENOUS; SUBCUTANEOUS at 12:28

## 2021-03-24 RX ADMIN — INSULIN LISPRO 5 UNITS: 100 INJECTION, SOLUTION INTRAVENOUS; SUBCUTANEOUS at 08:44

## 2021-03-24 RX ADMIN — CLOPIDOGREL BISULFATE 75 MG: 75 TABLET ORAL at 08:43

## 2021-03-24 NOTE — PLAN OF CARE
Problem: PHYSICAL THERAPY ADULT  Goal: Performs mobility at highest level of function for planned discharge setting  See evaluation for individualized goals  Description: Treatment/Interventions: Functional transfer training, LE strengthening/ROM, Therapeutic exercise, Patient/family training, Equipment eval/education, Bed mobility, Gait training, Compensatory technique education, OT          See flowsheet documentation for full assessment, interventions and recommendations  Outcome: Progressing  Note: Prognosis: Good  Problem List: Decreased strength, Impaired balance, Impaired vision, Decreased skin integrity  Assessment: Pt seen for PT treatment session this date with interventions consisting of gait training w/ emphasis on improving pt's ability to ambulate level surfaces x 20 feet & 150 feet with min A provided by therapist with RW and Therapeutic exercise consisting of: AROM 20 reps B LE in sitting position  Pt agreeable to PT treatment session upon arrival, pt found supine in bed w/ HOB elevated, in no apparent distress  In comparison to previous session, pt with improvements in amb distance/ activity tolerance  Post session: pt returned back to recliner, chair alarm engaged and all needs in reach Continue to recommend Home PT at time of d/c in order to maximize pt's functional independence and safety w/ mobility  Pt continues to be functioning below baseline level, and remains limited 2* factors listed above and including decreased strength & safe functional mobility  PT will continue to see pt while here in order to address the deficits listed above and provide interventions consistent w/ POC in effort to achieve STGs  Barriers to Discharge: None     PT Discharge Recommendation: Home with skilled therapy, Return to previous environment with social support     PT - OK to Discharge: Yes(when med cleared)    See flowsheet documentation for full assessment

## 2021-03-24 NOTE — DISCHARGE SUMMARY
[facility]  Discharge- Paulina Marie 1965, 49 y o  female MRN: [id number]  Unit/Bed#: E4 -01 Encounter: [id number]  Primary Care Provider: Jona Jones MD   Date and time admitted to hospital: 3/20/2021  7:38 PM    Type I diabetes mellitus [facility])  Assessment & Plan  Lab Results   Component Value Date    HGBA1C 11 2 (H) 01/13/2021       Recent Labs     03/23/21  2125 03/24/21  0726 03/24/21  0809 03/24/21  1107   POCGLU 144* 49* 184* 336*       Blood Sugar Average: Last 72 hrs:  (P) 290 [id number]     Patient has a history of type 1 diabetes followed by [facility] endocrinology  A1c was 11, demonstrating poor control  Patient initially presented with hyperglycemia secondary to [facility] and was initiated on insulin infusion  She had subsequent hypoglycemia, after skipping to and her insulin infusion was discontinued  Currently on Lantus 25 units at bedtime and lispro 5 units t i d   With meals  Continue close monitoring with Accu-Cheks  Follow-up with outpatient endocrinology    Pacemaker  Assessment & Plan  Patient has a history History of complete heart block, she is status post pacemaker placement  Continue outpatient cardiology follow-up    Subconjunctival hemorrhage  Assessment & Plan  History of retinopathy medical blindness from type 1 diabetes  She did have syncope with a subsequent fall 3/14 and was evaluated in the ER  Upon presenting to the ER on 03/20 patient had a CT scan of her brain without any acute abnormalities  She denies any headache, or facial pain  She is noted to have a subsequent right eye subconjunctival hemorrhage  Reports no eye pain or change in her baseline level of vision    Stage 3 chronic kidney disease  Assessment & Plan  Lab Results   Component Value Date    EGFR 57 03/22/2021    EGFR 55 03/21/2021    EGFR 43 03/21/2021    CREATININE 1 10 03/22/2021    CREATININE 1 14 03/21/2021    CREATININE 1 41 (H) 03/21/2021     Patient has a history of chronic kidney disease stage 3 with a baseline creatinine that appears to range 1 1-1 49  She presented with an elevated creatinine of 1 7, elevated above her baseline due to her hyperglycemia and HHNK  She did not meet criteria for acute kidney injury however  She was given IV fluids and her creatinine improved    Essential hypertension  Assessment & Plan  Patient has a history of essential hypertension  She was continued on her home regimen with Norvasc 5 mg daily and atenolol 25 mg daily    Coronary artery disease  Assessment & Plan  Patient has a History of coronary artery disease, with a right coronary artery stent placement 2017  No evidence of acute ischemia  She was continued on her home medical regimen with aspirin, Plavix, statin, beta-blocker    Graves disease  Assessment & Plan  Patient has a history of Graves disease  Followed by endocrinology as an outpatient  Currently on Tapazole 20 mg daily  Continue home regimen outpatient endocrinology follow-up      Transition of Care Discharge Summary - [facility]    Patient Information: Jeanie Connelly 49 y o  female MRN: [id number]  Unit/Bed#: E4 -01 Encounter: [id number]    Discharging Physician / Practitioner: Toni Lagunas MD  PCP: Mary Hernandez MD  Admission Date: 3/20/2021  Discharge Date: 03/24/21    Disposition:      [place] [facility]      Reason for Admission: elevated blood sugars    Discharge Diagnoses:     Principal Problem (Resolved):     Hyperosmolar hyperglycemic state (HHS) ([facility] [address] [address] )  Active Problems:    Type I diabetes mellitus ([facility] [address] )    Graves disease    Coronary artery disease    Essential hypertension    Stage 3 chronic kidney disease    Subconjunctival hemorrhage    Pacemaker  Resolved Problems:    Hyponatremia      Consultations During Hospital Stay:  · None      Procedures Performed:     · none    Medication Adjustments and Discharge Medications:  · Medication Dosing Tapers - Please refer to Discharge Medication List for details on any medication dosing tapers (if applicable to patient)  · Discharge Medication List: See after visit summary for reconciled discharge medications  Wound Care Recommendations:  When applicable, please see wound care section of After Visit Summary  Diet Recommendations at Discharge:  Diet -        Diet Orders   (From admission, onward)             Start     Ordered    03/24/21 0745  Diet Mario/CHO Controlled; Consistent Carbohydrate Diet Level 1 (4 carb servings/60 grams CHO/meal)  (ED Bridging Orders Panel)  Diet effective now     Question Answer Comment   Diet Type Mario/CHO Controlled    Mario/CHO Controlled Consistent Carbohydrate Diet Level 1 (4 carb servings/60 grams CHO/meal)    RD to adjust diet per protocol? Yes        03/24/21 0744              Fluid Restriction - No Fluid Restriction at Discharge  Significant Findings / Test Results:     · CT head negative       Hospital Course:     Filipe Valentin is a 49 y o  female patient who originally presented to the hospital on 3/20/2021 due to elevated blood sugars  Patient has history of type 1 diabetes mellitus follows [facility] endocrinology  Admitted with hyperglycemia secondary to HHNK and initiated on insulin infusion  Patient was bridged to Lantus, does have episodes of hypoglycemia however states she had her dinner earlier than she normally does and did not receive an evening snack  Patient otherwise medically stable for discharge back to [facility] today  Please see above problem list for further details        Condition at Discharge: good     Discharge Day Visit / Exam:     Subjective:  Patient seen and examined at bedside, denies any complaints    Vitals: Blood Pressure: 126/65 (03/24/21 0700)  Pulse: 65 (03/24/21 0700)  Temperature: 97 8 °F (36 6 °C) (03/24/21 0700)  Temp Source: Temporal (03/24/21 0700)  Respirations: 18 (03/24/21 0700)  Height: 5' 1" (154 9 cm) (03/20/21 2158)  Weight - Scale: 54 1 kg (119 lb 3 2 oz) (03/24/21 0551)  SpO2: 98 % (03/24/21 0700)    Physical Exam:    Constitutional: Patient is oriented to person, place and time, no acute distress  HEENT:  Right eye subconjunctival hemorrhage  Cardiovascular: Normal S1S2, RRR, No murmurs/rubs/gallops appreciated  Pulmonary:  Bilateral air entry, No rhonchi/rales/wheezing appreciated  Abdominal: Soft, Bowel sounds present, Non-tender, Non-distended  Extremities:  No cyanosis, clubbing or edema  Neurological: Cranial nerves II-XII grossly intact, sensation intact, otherwise no focal neurological symptoms  Discharge instructions/Information to patient and family:   See after visit summary section titled Discharge Instructions for information provided to patient and family  Planned Readmission: no      Discharge Statement:  I spent 35 minutes discharging the patient  This time was spent on the day of discharge  I had direct contact with the patient on the day of discharge  Greater than 50% of the total time was spent examining patient, answering all patient questions, arranging and discussing plan of care with patient as well as directly providing post-discharge instructions  Additional time then spent on discharge activities      ** Please Note: This note has been constructed using a voice recognition system **

## 2021-03-24 NOTE — ASSESSMENT & PLAN NOTE
Lab Results   Component Value Date    HGBA1C 11 2 (H) 01/13/2021       Recent Labs     03/23/21  2125 03/24/21  0726 03/24/21  0809 03/24/21  1107   POCGLU 144* 49* 184* 336*       Blood Sugar Average: Last 72 hrs:  (P) 359 [id number]     Patient has a history of type 1 diabetes followed by [facility] endocrinology  A1c was 11, demonstrating poor control  Patient initially presented with hyperglycemia secondary to [facility] and was initiated on insulin infusion  She had subsequent hypoglycemia, after skipping to and her insulin infusion was discontinued  Currently on Lantus 25 units at bedtime and lispro 5 units t i d   With meals  Continue close monitoring with Accu-Cheks  Follow-up with outpatient endocrinology

## 2021-03-24 NOTE — PLAN OF CARE
Problem: Potential for Falls  Goal: Patient will remain free of falls  Description: INTERVENTIONS:  - Assess patient frequently for physical needs  -  Identify cognitive and physical deficits and behaviors that affect risk of falls  -  [place] fall precautions as indicated by assessment   - Educate patient/family on patient safety including physical limitations  - Instruct patient to call for assistance with activity based on assessment  - Modify environment to reduce risk of injury  - Consider OT/PT consult to assist with strengthening/mobility  Outcome: Progressing     Problem: PAIN - ADULT  Goal: Verbalizes/displays adequate comfort level or baseline comfort level  Description: Interventions:  - Encourage patient to monitor pain and request assistance  - Assess pain using appropriate pain scale  - Administer analgesics based on type and severity of pain and evaluate response  - Implement non-pharmacological measures as appropriate and evaluate response  - Consider cultural and social influences on pain and pain management  - Notify physician/advanced practitioner if interventions unsuccessful or patient reports new pain  Outcome: Progressing     Problem: SAFETY ADULT  Goal: Patient will remain free of falls  Description: INTERVENTIONS:  - Assess patient frequently for physical needs  -  Identify cognitive and physical deficits and behaviors that affect risk of falls    -  [place] fall precautions as indicated by assessment   - Educate patient/family on patient safety including physical limitations  - Instruct patient to call for assistance with activity based on assessment  - Modify environment to reduce risk of injury  - Consider OT/PT consult to assist with strengthening/mobility  Outcome: Progressing  Goal: Maintain or return to baseline ADL function  Description: INTERVENTIONS:  -  Assess patient's ability to carry out ADLs; assess patient's baseline for ADL function and identify physical deficits which impact ability to perform ADLs (bathing, care of mouth/teeth, toileting, grooming, dressing, etc )  - Assess/evaluate cause of self-care deficits   - Assess range of motion  - Assess patient's mobility; develop plan if impaired  - Assess patient's need for assistive devices and provide as appropriate  - Encourage maximum independence but intervene and supervise when necessary  - Involve family in performance of ADLs  - Assess for home care needs following discharge   - Consider OT consult to assist with ADL evaluation and planning for discharge  - Provide patient education as appropriate  Outcome: Progressing  Goal: Maintain or return mobility status to optimal level  Description: INTERVENTIONS:  - Assess patient's baseline mobility status (ambulation, transfers, stairs, etc )    - Identify cognitive and physical deficits and behaviors that affect mobility  - Identify mobility aids required to assist with transfers and/or ambulation (gait belt, sit-to-stand, lift, walker, cane, etc )  - [place] fall precautions as indicated by assessment  - Record patient progress and toleration of activity level on Mobility SBAR; progress patient to next Phase/Stage  - Instruct patient to call for assistance with activity based on assessment  - Consider rehabilitation consult to assist with strengthening/weightbearing, etc   Outcome: Progressing     Problem: DISCHARGE PLANNING  Goal: Discharge to home or other facility with appropriate resources  Description: INTERVENTIONS:  - Identify barriers to discharge w/patient and caregiver  - Arrange for needed discharge resources and transportation as appropriate  - Identify discharge learning needs (meds, wound care, etc )  - Arrange for interpretive services to assist at discharge as needed  - Refer to Case Management Department for coordinating discharge planning if the patient needs post-hospital services based on physician/advanced practitioner order or complex needs related to functional status, cognitive ability, or social support system  Outcome: Progressing     Problem: Knowledge Deficit  Goal: Patient/family/caregiver demonstrates understanding of disease process, treatment plan, medications, and discharge instructions  Description: Complete learning assessment and assess knowledge base    Interventions:  - Provide teaching at level of understanding  - Provide teaching via preferred learning methods  Outcome: Progressing     Problem: METABOLIC, FLUID AND ELECTROLYTES - ADULT  Goal: Glucose maintained within target range  Description: INTERVENTIONS:  - Monitor Blood Glucose as ordered  - Assess for signs and symptoms of hyperglycemia and hypoglycemia  - Administer ordered medications to maintain glucose within target range  - Assess nutritional intake and initiate nutrition service referral as needed  Outcome: Progressing

## 2021-03-24 NOTE — CASE MANAGEMENT
MD is planning on discharging pt back to [facility] today  They will accept back  Pt is a MA bed hold  COVID test is pending  TC to [facility] for w/c van  They will call once set up  [facility] w/c [facility] will  at 1430  MD, RN, SNF and Cora Powers from [facility] aware of  time

## 2021-03-24 NOTE — PHYSICAL THERAPY NOTE
03/24/21 1019   PT Last Visit   PT Visit Date 03/24/21   Note Type   Note Type Treatment   Pain Assessment   Pain Assessment Tool Pain Assessment not indicated - pt denies pain   Restrictions/Precautions   Weight Bearing Precautions Per Order No   Other Precautions Chair Alarm; Bed Alarm; Fall Risk;Visual impairment   General   Chart Reviewed Yes   Family/Caregiver Present No   Cognition   Overall Cognitive Status WFL   Arousal/Participation Alert; Cooperative   Attention Within functional limits   Orientation Level Oriented X4   Memory Within functional limits   Following Commands Follows all commands and directions without difficulty   Comments pt agreed to PT session   Subjective   Subjective "I'm glad you're here  I want to get out of this bed "   Bed Mobility   Supine to Sit 5  Supervision   Additional items Bedrails; Increased time required   Additional Comments pt sat OOB in chair to end session, all needs in reach, alarm on   Transfers   Sit to Stand 5  Supervision   Additional items Assist x 1;Verbal cues   Stand to Sit 5  Supervision   Additional items Assist x 1; Armrests; Increased time required;Verbal cues   Toilet transfer 5  Supervision   Additional items Assist x 2; Increased time required;Verbal cues;Standard toilet   Additional Comments pt stood at sink to wash/dry hands with RW S x 1  VCs & some tactile cues when amb due to vision loss   Ambulation/Elevation   Gait pattern Narrow RACIEL; Short stride; Excessively slow   Gait Assistance 4  Minimal assist   Additional items Assist x 1;Verbal cues; Tactile cues  (cueing due to vision loss)   Assistive Device Rolling walker   Distance 20 feet & 150 feet   Stair Management Assistance Not tested   Balance   Static Sitting Normal   Dynamic Sitting Good   Static Standing Fair   Dynamic Standing Fair -   Ambulatory Fair -   Endurance Deficit   Endurance Deficit No   Activity Tolerance   Activity Tolerance Patient tolerated treatment well;Treatment limited secondary to medical complications (Comment)  (vision loss)   Nurse Made Aware yes   Exercises   Hip Flexion Sitting;20 reps;AROM; Bilateral   Hip Abduction Sitting;20 reps;AROM; Bilateral   Hip Adduction Sitting;20 reps;AROM; Bilateral  (isometric)   Knee AROM Long Arc Quad Sitting;20 reps;AROM; Bilateral   Ankle Pumps Sitting;20 reps;AROM; Bilateral   Assessment   Prognosis Good   Problem List Decreased strength; Impaired balance; Impaired vision;Decreased skin integrity   Assessment Pt seen for PT treatment session this date with interventions consisting of gait training w/ emphasis on improving pt's ability to ambulate level surfaces x 20 feet & 150 feet with min A provided by therapist with RW and Therapeutic exercise consisting of: AROM 20 reps B LE in sitting position  Pt agreeable to PT treatment session upon arrival, pt found supine in bed w/ HOB elevated, in no apparent distress  In comparison to previous session, pt with improvements in amb distance/ activity tolerance  Post session: pt returned back to recliner, chair alarm engaged and all needs in reach Continue to recommend Home PT at time of d/c in order to maximize pt's functional independence and safety w/ mobility  Pt continues to be functioning below baseline level, and remains limited 2* factors listed above and including decreased strength & safe functional mobility  PT will continue to see pt while here in order to address the deficits listed above and provide interventions consistent w/ POC in effort to achieve STGs  Barriers to Discharge None   Goals   Patient Goals to go back to [facility] today   PT Treatment Day 1   Plan   Treatment/Interventions Functional transfer training;LE strengthening/ROM; Therapeutic exercise; Endurance training;Patient/family training;Equipment eval/education; Bed mobility;Gait training; Compensatory technique education;Spoke to nursing   Progress Progressing toward goals   PT Frequency 2-3x/wk   Recommendation   PT Discharge Recommendation Home with skilled therapy; Return to previous environment with social support   PT - OK to Discharge Yes  (when med cleared)   Shi 8 in Bed Without Bedrails 3   Lying on Back to Sitting on Edge of Flat Bed 3   Moving Bed to Chair 3   Standing Up From Chair 3   Walk in Room 3   Climb 3-5 Stairs 2   Basic Mobility Inpatient Raw Score 17   Basic Mobility Standardized Score 39 67   Gris Frias, PTA

## 2021-03-26 LAB
BACTERIA BLD CULT: NORMAL
BACTERIA BLD CULT: NORMAL